# Patient Record
Sex: MALE | HISPANIC OR LATINO | Employment: PART TIME | URBAN - METROPOLITAN AREA
[De-identification: names, ages, dates, MRNs, and addresses within clinical notes are randomized per-mention and may not be internally consistent; named-entity substitution may affect disease eponyms.]

---

## 2017-01-13 ENCOUNTER — SLEEP STUDY (OUTPATIENT)
Dept: SLEEP MEDICINE | Facility: MEDICAL CENTER | Age: 62
End: 2017-01-13
Attending: NURSE PRACTITIONER
Payer: COMMERCIAL

## 2017-01-13 DIAGNOSIS — R06.83 SNORING: ICD-10-CM

## 2017-01-13 DIAGNOSIS — R53.83 FATIGUE, UNSPECIFIED TYPE: ICD-10-CM

## 2017-01-13 PROCEDURE — 95810 POLYSOM 6/> YRS 4/> PARAM: CPT | Performed by: INTERNAL MEDICINE

## 2017-01-16 NOTE — PROCEDURES
Clinical Comments:  The patient underwent a comprehensive polysomnogram using the standard montage for measurement of parameters of sleep, respiratory events, movement abnormalities, heart rate and rhythm. A microphone was used to monitor snoring.      INTERPRETATION:  The total recording time was 403.5 minutes with a sleep period of 329.1 minutes and the total sleep time was 253.5 minutes with a sleep efficiency of 62.8%.  The sleep latency was 74.4 minutes, and REM latency was 199.0 minutes.  The patient experienced 126 arousals in total, for an arousal index of 29.8    RESPIRATORY: The patient had 123 apneas in total.  Of these, 108 were obstructive apneas, and 15 were central apneas.  This resulted in an apnea index (AI) of 29.1.  The patient had 143 hypopneas, for a hypopnea index of 33.8.  The overall AHI was 63.0, while the AHI during Stage R sleep was 67.9.  AHI while supine was 108.0.    OXIMETRY: Oxygen saturation monitoring showed a mean SpO2 of 91.7%, with a minimum oxygen saturation of 0.0%.  Oxygen saturations were less than or = 89% for 26.1 minutes of sleep time.    CARDIAC: The highest heart rate during the recording was 86.0 beats per minute.  The average heart rate during sleep was 62.8 bpm.    LIMB MOVEMENTS: There were a total of 78 PLMs during sleep, of which 7 were PLMs arousals.  This resulted in a PLMS index of 18.5.    Overnight sleep study was accomplished on January 13, 2017 with patient having a delay in sleep onset. Once asleep the patient had frequent events and a total sleep time of 4.2 hours was observed. No slow-wave sleep was seen. Leg movements occurred but arousal index was low. Apnea-hypopnea index was high at 63. Oxygen desaturation as low as 71% and below 90% almost 20% of the time monitored.    This represents severe sleep-disordered breathing, patient should return for CPAP titration given the severity and degree of desaturation. A split-night study could not be accomplished  as there was a delay in sleep onset, consideration should be given to utilizing Ambien if clinically indicated

## 2017-01-31 ENCOUNTER — SLEEP CENTER VISIT (OUTPATIENT)
Dept: SLEEP MEDICINE | Facility: MEDICAL CENTER | Age: 62
End: 2017-01-31
Payer: COMMERCIAL

## 2017-01-31 VITALS
RESPIRATION RATE: 14 BRPM | TEMPERATURE: 98.4 F | DIASTOLIC BLOOD PRESSURE: 82 MMHG | HEIGHT: 69 IN | WEIGHT: 218 LBS | BODY MASS INDEX: 32.29 KG/M2 | SYSTOLIC BLOOD PRESSURE: 130 MMHG | HEART RATE: 79 BPM

## 2017-01-31 DIAGNOSIS — G47.33 OSA (OBSTRUCTIVE SLEEP APNEA): ICD-10-CM

## 2017-01-31 DIAGNOSIS — R53.83 FATIGUE, UNSPECIFIED TYPE: ICD-10-CM

## 2017-01-31 DIAGNOSIS — R06.83 SNORING: ICD-10-CM

## 2017-01-31 DIAGNOSIS — I10 ESSENTIAL HYPERTENSION: ICD-10-CM

## 2017-01-31 DIAGNOSIS — F45.8 BRUXISM: ICD-10-CM

## 2017-01-31 PROCEDURE — 99214 OFFICE O/P EST MOD 30 MIN: CPT | Performed by: NURSE PRACTITIONER

## 2017-01-31 RX ORDER — ZOLPIDEM TARTRATE 5 MG/1
5 TABLET ORAL NIGHTLY PRN
Qty: 3 TAB | Refills: 0 | Status: SHIPPED | OUTPATIENT
Start: 2017-01-31 | End: 2017-03-20

## 2017-01-31 NOTE — PATIENT INSTRUCTIONS
1. Titration study ASAP  2. Please fill Ambien and bring it with you to the night of the sleep study  3. Follow-up with me after titration study to review results

## 2017-01-31 NOTE — PROGRESS NOTES
Chief Complaint   Patient presents with   • Results     SS         HPI: This patient is a 61 y.o. male, who presents for sleep study results. He was just seen in December for evaluation of PROSPER. He was referred by his PCP Dr. Ogden. He has a history of hypertension on lisinopril. He complains of TMJ, snoring and difficulty breathing at night, resuscitative gasps, morning headaches, bruxism. He will wake up 2-3 times per night for unknown reasons. He complains it is very difficult for him to get out of bed in the morning, he will nap after arising. He has seen ENT for complaints of TMJ. He was hoping that evaluation for sleep apnea and treatment may help with TMJ. It is causing him a significant amount of distress. I reiterated at his last visit that treatment for sleep apnea, should he need it, is unlikely to improve his TMJ.   Diagnostic PSG indicates severe PROSPER with an AHI of 63 and a minimum oxygen saturation of 71%. Patient had a difficult time falling asleep the night of study thus, no titration was completed. I reviewed these results with the patient today. We discussed a sleep aid to help for future studies. He requires titration to CPAP given the severity of his sleep apnea. I again reviewed with him the pathophysiology of obstructive sleep apnea as well as potential cardiac implications. He is hoping that treatment for sleep apnea his TMJ and bruxism. I encouraged her to follow-up with his dentist regarding dental appliance for bruxism.      Past Medical History   Diagnosis Date   • Back pain    • Hepatitis B    • TMJ (dislocation of temporomandibular joint)    • Chickenpox    • Influenza    • Tonsillitis        Social History   Substance Use Topics   • Smoking status: Current Every Day Smoker -- 0.25 packs/day for 21 years     Types: Cigarettes   • Smokeless tobacco: Never Used   • Alcohol Use: 0.0 - 3.0 oz/week     0-5 Cans of beer per week      Comment: Occasionally       Family History   Problem  "Relation Age of Onset   • No Known Problems Mother    • Diabetes Father    • No Known Problems Sister    • No Known Problems Brother    • Sleep Apnea Neg Hx    • No Known Problems Sister    • No Known Problems Sister    • No Known Problems Brother    • No Known Problems Brother    • No Known Problems Brother    • No Known Problems Brother    • No Known Problems Daughter    • No Known Problems Son    • No Known Problems Daughter        Current medications as of today   Current Outpatient Prescriptions   Medication Sig Dispense Refill   • zolpidem (AMBIEN) 5 MG Tab Take 1 Tab by mouth at bedtime as needed for Sleep (1 to 3 po qhs prn insomnia/sleep study. Bring to sleep study.). 3 Tab 0   • ULORIC 40 MG Tab Take 40 mg by mouth every day.  5   • lisinopril (PRINIVIL) 10 MG Tab Take 10 mg by mouth.  6   • CIALIS 20 MG tablet Take 20 mg by mouth 1 time daily as needed.  6     No current facility-administered medications for this visit.       Allergies: Ibuprofen    Blood pressure 130/82, pulse 79, temperature 36.9 °C (98.4 °F), temperature source Temporal, resp. rate 14, height 1.753 m (5' 9.02\"), weight 98.884 kg (218 lb).      ROS:   Constitutional: Denies fevers, chills, night sweats, weight loss. Positive for fatigue.  HEENT: Denies earache, difficulty hearing, tinnitus, nasal congestion, hoarseness  Cardiovascular: Denies chest pain, tightness, palpitations, orthopnea or edema  Respiratory: Denies, wheeze, dyspnea, hemoptysis. Currently has a cold, mild dry cough  Sleep: See HPI  GI: Denies heartburn, dysphagia, nausea, abdominal pain, diarrhea or constipation  : Denies frequent urination, hematuria, discharge or painful urination  Musculoskeletal: Denies back pain, painful joints, sore muscles  Neurological: Denies weakness or headaches  Skin: No rashes    Physical exam:   Appearance: Well-nourished, well-developed, in no acute distress  HEENT: Normocephalic, atraumatic, white sclera, PERRLA  Respiratory: no " intercostal retractions or accessory muscle use   Lungs auscultation: Clear to auscultation bilaterally  Cardiovascular: Regular rate rhythm no murmurs, rubs or gallops  Gait: Normal  Digits: No clubbing, cyanosis  Motor: No focal deficits  Orientation: Oriented to time, person and place    Diagnosis:  1. Snoring     2. PROSPER (obstructive sleep apnea)  zolpidem (AMBIEN) 5 MG Tab    POLYSOMNOGRAPHY TITRATION   3. Bruxism     4. Fatigue, unspecified type     5. Essential hypertension         Plan:  1. Titration study ASAP  2. Rx for Ambien provided  3. Follow-up after titration to review results  4. Follow up with dentist regarding appliance for bruxism

## 2017-01-31 NOTE — MR AVS SNAPSHOT
"        Terrell Ruthos   2017 9:40 AM   Sleep Center Visit   MRN: [id number]    Department:  [facility]   Dept Phone:  [phone]    Description:  Male : 1955   Provider:  LO Church           Reason for Visit     Results SS      Allergies as of 2017     Allergen Noted Reactions    Ibuprofen 2016   Hives, Itching      You were diagnosed with     Snoring   [130933]       PROSPER (obstructive sleep apnea)   [342150]       Bruxism   [671420]       Fatigue, unspecified type   [7884920]       Essential hypertension   [2956528]         Vital Signs     Blood Pressure Pulse Temperature Respirations Height Weight    130/82 mmHg 79 36.9 °C (98.4 °F) (Temporal) 14 1.753 m (5' 9.02\") 98.884 kg (218 lb)    Body Mass Index Smoking Status                32.18 kg/m2 Current Every Day Smoker          Basic Information     Date Of Birth Sex Race Ethnicity Preferred Language    1955 Male  or   Origin (Tongan,Ethiopian,Nicaraguan,Ivorian, etc) English      Your appointments     Mar 07, 2017  8:00 PM   Sleep Study with SLEEP TECH   [facility] (--)    [address]  [facility] [address]  [place] [id number] [phone]            Mar 17, 2017  9:40 AM   Follow UP with LO Church   [facility] (--)    [address]  [facility] [address]  [place] [address]   [phone]              Problem List              ICD-10-CM Priority Class Noted - Resolved    Snoring R06.83   2016 - Present    Fatigue R53.83   2016 - Present    TMJ (dislocation of temporomandibular joint) S03.00XA   2016 - Present    HTN (hypertension) I10   2016 - Present    Bruxism F45.8   2017 - Present      Health Maintenance        Date Due Completion Dates    IMM DTaP/Tdap/Td Vaccine (1 - Tdap) 10/1/1975 ---    COLONOSCOPY 10/1/2006 ---    IMM INFLUENZA (1) 2016 ---    IMM ZOSTER VACCINE 10/1/2016 ---            "   Current Immunizations     No immunizations on file.      Below and/or attached are the medications your provider expects you to take. Review all of your home medications and newly ordered medications with your provider and/or pharmacist. Follow medication instructions as directed by your provider and/or pharmacist. Please keep your medication list with you and share with your provider. Update the information when medications are discontinued, doses are changed, or new medications (including over-the-counter products) are added; and carry medication information at all times in the event of emergency situations     Allergies:  IBUPROFEN - Hives,Itching               Medications  Valid as of: January 31, 2017 - 10:09 AM    Generic Name Brand Name Tablet Size Instructions for use    Febuxostat (Tab) ULORIC 40 MG Take 40 mg by mouth every day.        Lisinopril (Tab) PRINIVIL 10 MG Take 10 mg by mouth.        Tadalafil (Tab) CIALIS 20 MG Take 20 mg by mouth 1 time daily as needed.        Zolpidem Tartrate (Tab) AMBIEN 5 MG Take 1 Tab by mouth at bedtime as needed for Sleep (1 to 3 po qhs prn insomnia/sleep study. Bring to sleep study.).        .                 Medicines prescribed today were sent to:     [facility]/PHARMACY #[id number] - [place], [place] - [address]    [address] [place] [address]    Phone: [phone] Fax: [phone]    Open 24 Hours?: No      Medication refill instructions:       If your prescription bottle indicates you have medication refills left, it is not necessary to call your provider’s office. Please contact your pharmacy and they will refill your medication.    If your prescription bottle indicates you do not have any refills left, you may request refills at any time through one of the following ways: The online eBureau system (except Urgent Care), by calling your provider’s office, or by asking your pharmacy to contact your provider’s office with a refill request. Medication refills are  processed only during regular business hours and may not be available until the next business day. Your provider may request additional information or to have a follow-up visit with you prior to refilling your medication.   *Please Note: Medication refills are assigned a new Rx number when refilled electronically. Your pharmacy may indicate that no refills were authorized even though a new prescription for the same medication is available at the pharmacy. Please request the medicine by name with the pharmacy before contacting your provider for a refill.        Your To Do List     Future Labs/Procedures Complete By Expires    POLYSOMNOGRAPHY TITRATION  As directed 1/31/2018      Instructions    1. Titration study ASAP  2. Please fill Ambien and bring it with you to the night of the sleep study  3. Follow-up with me after titration study to review results          Youjia Access Code: [email or web address]  Expires: 2/13/2017  9:39 AM    Youjia  A secure, online tool to manage your health information     [employer]’s Youjia® is a secure, online tool that connects you to your personalized health information from the privacy of your home -- day or night - making it very easy for you to manage your healthcare. Once the activation process is completed, you can even access your medical information using the Youjia isidro, which is available for free in the Apple Isidro store or Google Play store.     Youjia provides the following levels of access (as shown below):   My Chart Features   [facility] Doctor [facility]  Specialists [facility]  Urgent  Care Non-[facility]  Primary Care  Doctor   Email your healthcare team securely and privately 24/7 X X X    Manage appointments: schedule your next appointment; view details of past/upcoming appointments X      Request prescription refills. X      View recent personal medical records, including lab and immunizations X X X X   View health record, including health history, allergies,  medications X X X X   Read reports about your outpatient visits, procedures, consult and ER notes X X X X   See your discharge summary, which is a recap of your hospital and/or ER visit that includes your diagnosis, lab results, and care plan. X X       How to register for Wickr:  1. Go to  [email or web address].  2. Click on the Sign Up Now box, which takes you to the New Member Sign Up page. You will need to provide the following information:  a. Enter your Wickr Access Code exactly as it appears at the top of this page. (You will not need to use this code after you’ve completed the sign-up process. If you do not sign up before the expiration date, you must request a new code.)   b. Enter your date of birth.   c. Enter your home email address.   d. Click Submit, and follow the next screen’s instructions.  3. Create a Wickr ID. This will be your Wickr login ID and cannot be changed, so think of one that is secure and easy to remember.  4. Create a Wickr password. You can change your password at any time.  5. Enter your Password Reset Question and Answer. This can be used at a later time if you forget your password.   6. Enter your e-mail address. This allows you to receive e-mail notifications when new information is available in Wickr.  7. Click Sign Up. You can now view your health information.    For assistance activating your Wickr account, call [phone]

## 2017-03-07 ENCOUNTER — SLEEP STUDY (OUTPATIENT)
Dept: SLEEP MEDICINE | Facility: MEDICAL CENTER | Age: 62
End: 2017-03-07
Attending: NURSE PRACTITIONER
Payer: COMMERCIAL

## 2017-03-07 DIAGNOSIS — G47.33 OSA (OBSTRUCTIVE SLEEP APNEA): ICD-10-CM

## 2017-03-07 PROCEDURE — 95810 POLYSOM 6/> YRS 4/> PARAM: CPT | Performed by: INTERNAL MEDICINE

## 2017-03-07 NOTE — MR AVS SNAPSHOT
Terrell White   3/7/2017 8:00 PM   Sleep Study   MRN: [id number]    Department:  [facility]   Dept Phone:  [phone]    Description:  Male : 1955   Provider:  Ramiro Dennison M.D.           Allergies as of 3/7/2017     Allergen Noted Reactions    Ibuprofen 2016   Hives, Itching      You were diagnosed with     PROSPER (obstructive sleep apnea)   [811045]         Vital Signs     Smoking Status                   Current Every Day Smoker           Basic Information     Date Of Birth Sex Race Ethnicity Preferred Language    1955 Male  or   Origin (Welsh,Fijian,[place] citizen,Botswanan, etc) English      Your appointments     Mar 17, 2017  9:40 AM   Follow UP with LO Church   [facility] (--)    [address] [facility]  [place] [address]   [phone]              Problem List              ICD-10-CM Priority Class Noted - Resolved    Snoring R06.83   2016 - Present    Fatigue R53.83   2016 - Present    TMJ (dislocation of temporomandibular joint) S03.00XA   2016 - Present    HTN (hypertension) I10   2016 - Present    Bruxism F45.8   2017 - Present      Health Maintenance        Date Due Completion Dates    IMM DTaP/Tdap/Td Vaccine (1 - Tdap) 10/1/1975 ---    COLONOSCOPY 10/1/2006 ---    IMM INFLUENZA (1) 2016 ---    IMM ZOSTER VACCINE 10/1/2016 ---            Current Immunizations     No immunizations on file.      Below and/or attached are the medications your provider expects you to take. Review all of your home medications and newly ordered medications with your provider and/or pharmacist. Follow medication instructions as directed by your provider and/or pharmacist. Please keep your medication list with you and share with your provider. Update the information when medications are discontinued, doses are changed, or new medications (including over-the-counter products) are added; and  carry medication information at all times in the event of emergency situations     Allergies:  IBUPROFEN - Hives,Itching               Medications  Valid as of: March 08, 2017 -  6:28 AM    Generic Name Brand Name Tablet Size Instructions for use    Febuxostat (Tab) ULORIC 40 MG Take 40 mg by mouth every day.        Lisinopril (Tab) PRINIVIL 10 MG Take 10 mg by mouth.        Tadalafil (Tab) CIALIS 20 MG Take 20 mg by mouth 1 time daily as needed.        Zolpidem Tartrate (Tab) AMBIEN 5 MG Take 1 Tab by mouth at bedtime as needed for Sleep (1 to 3 po qhs prn insomnia/sleep study. Bring to sleep study.).        .                 Medicines prescribed today were sent to:     [facility]/PHARMACY #[id number] - [place], [place] - [address]    [address] [place] [address]    Phone: [phone] Fax: [phone]    Open 24 Hours?: No      Medication refill instructions:       If your prescription bottle indicates you have medication refills left, it is not necessary to call your provider’s office. Please contact your pharmacy and they will refill your medication.    If your prescription bottle indicates you do not have any refills left, you may request refills at any time through one of the following ways: The online Couplewise system (except Urgent Care), by calling your provider’s office, or by asking your pharmacy to contact your provider’s office with a refill request. Medication refills are processed only during regular business hours and may not be available until the next business day. Your provider may request additional information or to have a follow-up visit with you prior to refilling your medication.   *Please Note: Medication refills are assigned a new Rx number when refilled electronically. Your pharmacy may indicate that no refills were authorized even though a new prescription for the same medication is available at the pharmacy. Please request the medicine by name with the pharmacy before contacting your  provider for a refill.           Jobspotting Access Code: [email or web address]  Expires: 3/14/2017 10:22 AM    Jobspotting  A secure, online tool to manage your health information     [employer]’s Jobspotting® is a secure, online tool that connects you to your personalized health information from the privacy of your home -- day or night - making it very easy for you to manage your healthcare. Once the activation process is completed, you can even access your medical information using the Jobspotting isidro, which is available for free in the Apple Isidro store or Google Play store.     Jobspotting provides the following levels of access (as shown below):   My Chart Features   [facility] Primary Care Doctor [facility]  Specialists [facility]  Urgent  Care Non-[facility]  Primary Care  Doctor   Email your healthcare team securely and privately 24/7 X X X    Manage appointments: schedule your next appointment; view details of past/upcoming appointments X      Request prescription refills. X      View recent personal medical records, including lab and immunizations X X X X   View health record, including health history, allergies, medications X X X X   Read reports about your outpatient visits, procedures, consult and ER notes X X X X   See your discharge summary, which is a recap of your hospital and/or ER visit that includes your diagnosis, lab results, and care plan. X X       How to register for Jobspotting:  1. Go to  [email or web address].  2. Click on the Sign Up Now box, which takes you to the New Member Sign Up page. You will need to provide the following information:  a. Enter your Jobspotting Access Code exactly as it appears at the top of this page. (You will not need to use this code after you’ve completed the sign-up process. If you do not sign up before the expiration date, you must request a new code.)   b. Enter your date of birth.   c. Enter your home email address.   d. Click Submit, and follow the next screen’s instructions.  3. Create a  3-V Bioscien[employer] ID. This will be your [employer] login ID and cannot be changed, so think of one that is secure and easy to remember.  4. Create a [employer] password. You can change your password at any time.  5. Enter your Password Reset Question and Answer. This can be used at a later time if you forget your password.   6. Enter your e-mail address. This allows you to receive e-mail notifications when new information is available in [employer].  7. Click Sign Up. You can now view your health information.    For assistance activating your [employer] account, call [phone]        Quit Tobacco Information     Do you want to quit using tobacco?    Quitting tobacco decreases risks of cancer, heart and lung disease, increases life expectancy, improves sense of taste and smell, and increases spending money, among other benefits.    If you are thinking about quitting, we can help.  • [facility] Quit Tobacco Program: 661.469.3962  o Program occurs weekly for four weeks and includes pharmacist consultation on products to support quitting smoking or chewing tobacco. A provider referral is needed for pharmacist consultation.  • Tobacco Users Help Hotline: 5-191-QUIT-NOW (822-3340) or https://[place].quitlogix.org/  o Free, confidential telephone and online coaching for [place] residents. Sessions are designed on a schedule that is convenient for you. Eligible clients receive free nicotine replacement therapy.  • Nationally: www.smokefree.gov  o Information and professional assistance to support both immediate and long-term needs as you become, and remain, a non-smoker. Smokefree.gov allows you to choose the help that best fits your needs.

## 2017-03-08 NOTE — PROCEDURES
Clinical Comments:  The patient underwent a comprehensive polysomnogram using the standard montage for measurement of parameters of sleep, respiratory events, movement abnormalities, heart rate and rhythm. A microphone was used to monitor snoring.      INTERPRETATION:  The total recording time was 416.0 minutes with a sleep period of 399.5 minutes and the total sleep time was 351.0 minutes with a sleep efficiency of 84.4%.  The sleep latency was 16.5 minutes, and REM latency was 36.5 minutes.  The patient experienced 187 arousals in total, for an arousal index of 32.0    RESPIRATORY: The patient had 1 apneas in total.  Of these, 0 were obstructive apneas, and 1 were central apneas.  This resulted in an apnea index (AI) of 0.2.  The patient had 70 hypopneas, for a hypopnea index of 12.0.  The overall AHI was 12.1, while the AHI during Stage R sleep was 9.3.  AHI while supine was 16.5.    OXIMETRY: Oxygen saturation monitoring showed a mean SpO2 of 95.0%, with a minimum oxygen saturation of 0.0%.  Oxygen saturations were less than or = 89% for 1.0 minutes of sleep time.    CARDIAC: The highest heart rate during the recording was 92.0 beats per minute.  The average heart rate during sleep was 68.3 bpm.    LIMB MOVEMENTS: There were a total of 76 PLMs during sleep, of which 4 were PLMs arousals.  This resulted in a PLMS index of 13.0.    The patient is a 61-year-old male who has hypertension and snoring with daytime fatigue. A previous sleep study demonstrated an apnea hypopnea index of 63 per hour and a low oxygen saturation of 71%.    Technical summary: The patient underwent a CPAP titration.  This was a 16 channel montage study to include a 6 channel EEG, a 2 channel EOG, and chin EMG, left and right leg EMG, a snore channel, and a CFLOW pressure transducer.   Respiratory effort was assessed with the use of a thoracic and abdominal monitor and overnight oximetry was obtained. Audio and video recordings were  reviewed. This was a fully attended study and sleep stage scoring was performed. The test was technically adequate.    General sleep summary:  During the overnight study, there was a normal sleep latency and moderately early at 36 minutes REM latency.   The total sleep time was 351 minutes and sleep efficiency was 84%.  Sleep stage proportions showed 9.5% stage I, 60% stage II and 22% REM sleep. There was no delta sleep.  In regards to sleep quality there was a moderate to severe degree of sleep fragmentation as shown by the arousal index of 32 an hour. The arousals were due to obstructive hypopneas predominantly.    CPAP Titration:  The CPAP pressure was initiated at 4 cm of water and the pressure was increased in an attempt to eliminate all sleep disordered breathing and snoring. The CPAP pressure was increased to 12 cm water and at this final pressure the patient was observed in the supine position and in the REM sleep stage. The apnea hypopnea index was 4.3 per hour and the low oxygen saturation 90% however some mild upper airway resistance was still observed at the final setting and a slightly higher CPAP pressure is recommended. Snoring was resolved. There were no significant periodic limb movements.  The patient demonstrated normal sinus rhythm and an average heart rate of 67 beats per minute.  There was no ventricular ectopy or tachyarrhythmias. The patient utilized large Priscilla View full face mask with heated humidification. The CPAP was well-tolerated and there were minimal air leaks. No supplemental oxygen was required.    Impression:  1. Nearly successful CPAP titration to 12 cm water with evidence of mild persistent upper airway resistance still observed at the final setting. A slightly higher CPAP pressures recommended  2. Obstructive sleep apnea  3. Hypertension  4. Excessive daytime somnolence  5. Early REM latency most likely due to initiation of CPAP therapy with prior REM sleep deprivation from  obstructive sleep apnea    Recommendations:  Recommend CPAP at 13 cm of water. Recommended a data card that can measure leak, apnea hypopnea index and compliance for further outpatient monitoring and management of CPAP therapy. In some cases alternative treatment options may prove effective in resolving sleep apnea and these options include upper airway surgery, the use of a dental orthotic or weight loss and positional therapy. Clinical correlation is required. In general patients with sleep apnea are advised to avoid alcohol and sedatives and to not operate a motor vehicle while drowsy. Also untreated sleep apnea is associated with an increased risk for cardiovascular disease.

## 2017-03-17 ENCOUNTER — APPOINTMENT (OUTPATIENT)
Dept: SLEEP MEDICINE | Facility: MEDICAL CENTER | Age: 62
End: 2017-03-17
Payer: COMMERCIAL

## 2017-03-20 ENCOUNTER — SLEEP CENTER VISIT (OUTPATIENT)
Dept: SLEEP MEDICINE | Facility: MEDICAL CENTER | Age: 62
End: 2017-03-20
Payer: COMMERCIAL

## 2017-03-20 VITALS
RESPIRATION RATE: 16 BRPM | TEMPERATURE: 97.9 F | DIASTOLIC BLOOD PRESSURE: 84 MMHG | HEIGHT: 69 IN | BODY MASS INDEX: 32.58 KG/M2 | WEIGHT: 220 LBS | HEART RATE: 69 BPM | OXYGEN SATURATION: 96 % | SYSTOLIC BLOOD PRESSURE: 126 MMHG

## 2017-03-20 DIAGNOSIS — G47.33 OSA (OBSTRUCTIVE SLEEP APNEA): ICD-10-CM

## 2017-03-20 PROCEDURE — 99214 OFFICE O/P EST MOD 30 MIN: CPT | Performed by: NURSE PRACTITIONER

## 2017-03-20 RX ORDER — ZOLPIDEM TARTRATE 5 MG/1
5 TABLET ORAL NIGHTLY PRN
Qty: 30 TAB | Refills: 0 | Status: ON HOLD | OUTPATIENT
Start: 2017-03-20 | End: 2019-12-19

## 2017-03-20 NOTE — PROGRESS NOTES
CC:  Here for f/u sleep issues as listed below    HPI:   Terrell presents today for follow up obstructive sleep apnea and titration results.  PSG from 1/2017 indicated an AHI of 63 and low oxygen of 71%.  He completed a titration study 3/2017 that was nearly successful at a CPAP pressure of 12 with a reduced AHI, correction of low oxygen, and REM rebound. However, for residual persistent upper airway resistance, a pressure of 13 is recommended. Patient is amendable to CPAP treatment. They understand they may need a future sleep study if treatment is ineffective. He has a hx of TMJ and requesting a referral to a specialist.  He already has been seen by an ENT, but reports he has not been provided treatment and is frustrated. I encouraged a second opinion. He was also encouraged per last OV to follow-up with his dentist regarding dental appliance for bruxism.    Patient is currently sleeping 6-8 hours per night with 1-3 nighttime awakenings. They have had trouble falling asleep and have taken Advil PM 1-3 pills/night for many years. HE did well the night of the study with ambien and requesting ambien. He finds he moves a lot at night. He had a PMLS index of 13.  They do not feel refreshed in the morning and c/o morning H/A, but feels it may be related to his TMJ. They occasionally feel tired throughout the day and takes naps  2-3 per week for appx 1-1 1/2 hours long.  Patient reports snoring and paroxysmal nocturnal dyspnea events. Denies apnea events. They have never fallen asleep in conversation, at the wheel, or at work.  They deny sleepwalking/talking.     Patient Active Problem List    Diagnosis Date Noted   • PROSPER (obstructive sleep apnea) 03/20/2017   • Bruxism 01/31/2017   • Snoring 12/02/2016   • Fatigue 12/02/2016   • TMJ (dislocation of temporomandibular joint) 12/02/2016   • HTN (hypertension) 12/02/2016       Past Medical History   Diagnosis Date   • Back pain    • Hepatitis B    • TMJ (dislocation of  temporomandibular joint)    • Chickenpox    • Influenza    • Tonsillitis        Past Surgical History   Procedure Laterality Date   • Carpal tunnel release     • Arthroscopy, knee     • Shoulder arthroplasty total     • Ear reconstruction         Family History   Problem Relation Age of Onset   • No Known Problems Mother    • Diabetes Father    • No Known Problems Sister    • No Known Problems Brother    • Sleep Apnea Neg Hx    • No Known Problems Sister    • No Known Problems Sister    • No Known Problems Brother    • No Known Problems Brother    • No Known Problems Brother    • No Known Problems Brother    • No Known Problems Daughter    • No Known Problems Son    • No Known Problems Daughter        Social History     Social History   • Marital Status:      Spouse Name: N/A   • Number of Children: N/A   • Years of Education: N/A     Occupational History   • Not on file.     Social History Main Topics   • Smoking status: Current Every Day Smoker -- 0.25 packs/day for 21 years     Types: Cigarettes   • Smokeless tobacco: Never Used   • Alcohol Use: 0.0 - 3.0 oz/week     0-5 Cans of beer per week      Comment: Occasionally   • Drug Use: No   • Sexual Activity: Not on file     Other Topics Concern   • Not on file     Social History Narrative       Current Outpatient Prescriptions   Medication Sig Dispense Refill   • zolpidem (AMBIEN) 5 MG Tab Take 1 Tab by mouth at bedtime as needed for Sleep. 30 Tab 0   • ULORIC 40 MG Tab Take 40 mg by mouth every day.  5   • lisinopril (PRINIVIL) 10 MG Tab Take 10 mg by mouth.  6   • CIALIS 20 MG tablet Take 20 mg by mouth 1 time daily as needed.  6     No current facility-administered medications for this visit.          Allergies: Ibuprofen      ROS   Gen: Denies fever, chills, unintentional weight loss, fatigue  Resp:Denies Dyspnea  CV: Denies chest pain, chest tightness  Sleep:Denies apnea  Neuro: Denies frequent headaches, weakness, dizziness  See HPI.  All other systems  "reviewed and negative        Vital signs for this encounter:  Filed Vitals:    03/20/17 1540   Height: 1.753 m (5' 9.02\")   Weight: 99.791 kg (220 lb)   Weight % change since last entry.: 0 %   BP: 126/84   Pulse: 69   BMI (Calculated): 32.47   Resp: 16   Temp: 36.6 °C (97.9 °F)   O2 sat % room air: 96 %                   Physical Exam:   Gen:         Alert and oriented, No apparent distress.   Neck:        No Lymphadenopathy.  Lungs:     Clear to auscultation bilaterally.    CV:          Regular rate and rhythm. No murmurs, rubs or gallops.   Abd:         Soft non tender, non distended.            Ext:          No clubbing, cyanosis, edema.    Assessment   1. PROSPER (obstructive sleep apnea)  DME CPAP    zolpidem (AMBIEN) 5 MG Tab       PLAN:   Patient Instructions   1) Start CPAP at [id number]  2) Discussed acclimating to machine and change mask within first 30 days if required. Bring machine to first appointment.  3) Return to ENT for further questions regarding TMJ  4) Vaccines: Recommend flu  5) Return in about 6 weeks (around 5/1/2017) for review of symptoms, if not sooner, follow up with YUSRA Bravo, Compliance.          "

## 2017-03-20 NOTE — MR AVS SNAPSHOT
"        Terrell Ruthos   3/20/2017 3:40 PM   Sleep Center Visit   MRN: [id number]    Department:  [facility]   Dept Phone:  [phone]    Description:  Male : 1955   Provider:  LO Swanson           Reason for Visit     Apnea Last seen 17    Results SS       Allergies as of 3/20/2017     Allergen Noted Reactions    Ibuprofen 2016   Hives, Itching      You were diagnosed with     PROSPER (obstructive sleep apnea)   [[id number]]         Vital Signs     Blood Pressure Pulse Temperature Respirations Height Weight    126/84 mmHg 69 36.6 °C (97.9 °F) 16 1.753 m (5' 9.02\") 99.791 kg (220 lb)    Body Mass Index Oxygen Saturation Smoking Status             32.47 kg/m2 96% Current Every Day Smoker         Basic Information     Date Of Birth Sex Race Ethnicity Preferred Language    1955 Male  or   Origin (Bermudian,Dutch,Swazi,Fijian, etc) English      Your appointments     May 05, 2017  8:40 AM   Follow UP with LO Swanson   [facility] (--)    [address] [id number] [phone]              Problem List              ICD-10-CM Priority Class Noted - Resolved    Snoring R06.83   2016 - Present    Fatigue R53.83   2016 - Present    TMJ (dislocation of temporomandibular joint) S03.00XA   2016 - Present    HTN (hypertension) I10   2016 - Present    Bruxism F45.8   2017 - Present    PROSPER (obstructive sleep apnea) G47.33   3/20/2017 - Present      Health Maintenance        Date Due Completion Dates    IMM DTaP/Tdap/Td Vaccine (1 - Tdap) 10/1/1975 ---    COLONOSCOPY 10/1/2006 ---    IMM INFLUENZA (1) 2016 ---    IMM ZOSTER VACCINE 10/1/2016 ---            Current Immunizations     No immunizations on file.      Below and/or attached are the medications your provider expects you to take. Review all of your home medications and newly ordered medications with your " provider and/or pharmacist. Follow medication instructions as directed by your provider and/or pharmacist. Please keep your medication list with you and share with your provider. Update the information when medications are discontinued, doses are changed, or new medications (including over-the-counter products) are added; and carry medication information at all times in the event of emergency situations     Allergies:  IBUPROFEN - Hives,Itching               Medications  Valid as of: March 20, 2017 -  4:12 PM    Generic Name Brand Name Tablet Size Instructions for use    Febuxostat (Tab) ULORIC 40 MG Take 40 mg by mouth every day.        Lisinopril (Tab) PRINIVIL 10 MG Take 10 mg by mouth.        Tadalafil (Tab) CIALIS 20 MG Take 20 mg by mouth 1 time daily as needed.        Zolpidem Tartrate (Tab) AMBIEN 5 MG Take 1 Tab by mouth at bedtime as needed for Sleep.        .                 Medicines prescribed today were sent to:     [id number] - [place], [place] - [address]    [address] [place] [address]    Phone: [phone] Fax: [phone]    Open 24 Hours?: No      Medication refill instructions:       If your prescription bottle indicates you have medication refills left, it is not necessary to call your provider’s office. Please contact your pharmacy and they will refill your medication.    If your prescription bottle indicates you do not have any refills left, you may request refills at any time through one of the following ways: The online DBVu system (except Urgent Care), by calling your provider’s office, or by asking your pharmacy to contact your provider’s office with a refill request. Medication refills are processed only during regular business hours and may not be available until the next business day. Your provider may request additional information or to have a follow-up visit with you prior to refilling your medication.   *Please Note: Medication refills are assigned a new Rx  number when refilled electronically. Your pharmacy may indicate that no refills were authorized even though a new prescription for the same medication is available at the pharmacy. Please request the medicine by name with the pharmacy before contacting your provider for a refill.        Instructions    1) Start CPAP at [id number]  2) Discussed acclimating to machine and change mask within first 30 days if required. Bring machine to first appointment.  3) Return to ENT for further questions regarding TMJ  4) Vaccines: Recommend flu  5) Return in about 6 weeks (around 5/1/2017) for review of symptoms, if not sooner, follow up with YUSRA Bravo, Compliance.              Adept Cloud Access Code: [email or web address]  Expires: 4/19/2017  4:12 PM    Adept Cloud  A secure, online tool to manage your health information     [employer]’s Adept Cloud® is a secure, online tool that connects you to your personalized health information from the privacy of your home -- day or night - making it very easy for you to manage your healthcare. Once the activation process is completed, you can even access your medical information using the Adept Cloud isidro, which is available for free in the Apple Isidro store or Google Play store.     Adept Cloud provides the following levels of access (as shown below):   My Chart Features   [facility] Doctor [facility]  Specialists [facility]  Urgent  Care Non-[facility]  Primary Care  Doctor   Email your healthcare team securely and privately 24/7 X X X    Manage appointments: schedule your next appointment; view details of past/upcoming appointments X      Request prescription refills. X      View recent personal medical records, including lab and immunizations X X X X   View health record, including health history, allergies, medications X X X X   Read reports about your outpatient visits, procedures, consult and ER notes X X X X   See your discharge summary, which is a recap of your hospital and/or ER visit that  includes your diagnosis, lab results, and care plan. X X       How to register for [employer]:  1. Go to  [email or web address].  2. Click on the Sign Up Now box, which takes you to the New Member Sign Up page. You will need to provide the following information:  a. Enter your Webshoz Access Code exactly as it appears at the top of this page. (You will not need to use this code after you’ve completed the sign-up process. If you do not sign up before the expiration date, you must request a new code.)   b. Enter your date of birth.   c. Enter your home email address.   d. Click Submit, and follow the next screen’s instructions.  3. Create a Webshoz ID. This will be your Webshoz login ID and cannot be changed, so think of one that is secure and easy to remember.  4. Create a [employer] password. You can change your password at any time.  5. Enter your Password Reset Question and Answer. This can be used at a later time if you forget your password.   6. Enter your e-mail address. This allows you to receive e-mail notifications when new information is available in Webshoz.  7. Click Sign Up. You can now view your health information.    For assistance activating your Webshoz account, call [phone]        Quit Tobacco Information     Do you want to quit using tobacco?    Quitting tobacco decreases risks of cancer, heart and lung disease, increases life expectancy, improves sense of taste and smell, and increases spending money, among other benefits.    If you are thinking about quitting, we can help.  • [facility] Quit Tobacco Program: [phone]  o Program occurs weekly for four weeks and includes pharmacist consultation on products to support quitting smoking or chewing tobacco. A provider referral is needed for pharmacist consultation.  • Tobacco Users Help Hotline: 3-800-QUIT-NOW (823-7506) or https://[place].quitlogix.org/  o Free, confidential telephone and online coaching for [place] residents. Sessions are  designed on a schedule that is convenient for you. Eligible clients receive free nicotine replacement therapy.  • Nationally: www.smokefree.gov  o Information and professional assistance to support both immediate and long-term needs as you become, and remain, a non-smoker. Smokefree.gov allows you to choose the help that best fits your needs.

## 2017-03-20 NOTE — PATIENT INSTRUCTIONS
1) Start CPAP at [id number]  2) Discussed acclimating to machine and change mask within first 30 days if required. Bring machine to first appointment.  3) Return to ENT for further questions regarding TMJ  4) Vaccines: Recommend flu  5) Return in about 6 weeks (around 5/1/2017) for review of symptoms, if not sooner, follow up with YUSRA Bravo, Compliance.

## 2017-04-17 ENCOUNTER — HOSPITAL ENCOUNTER (OUTPATIENT)
Dept: RADIOLOGY | Facility: MEDICAL CENTER | Age: 62
End: 2017-04-17
Attending: FAMILY MEDICINE
Payer: COMMERCIAL

## 2017-04-17 DIAGNOSIS — M54.2 CERVICALGIA: ICD-10-CM

## 2017-04-17 PROCEDURE — 72141 MRI NECK SPINE W/O DYE: CPT

## 2017-05-05 ENCOUNTER — SLEEP CENTER VISIT (OUTPATIENT)
Dept: SLEEP MEDICINE | Facility: MEDICAL CENTER | Age: 62
End: 2017-05-05
Payer: COMMERCIAL

## 2017-05-05 VITALS
SYSTOLIC BLOOD PRESSURE: 132 MMHG | DIASTOLIC BLOOD PRESSURE: 84 MMHG | WEIGHT: 221 LBS | HEART RATE: 63 BPM | OXYGEN SATURATION: 93 % | RESPIRATION RATE: 16 BRPM | TEMPERATURE: 98.1 F | HEIGHT: 69 IN | BODY MASS INDEX: 32.73 KG/M2

## 2017-05-05 DIAGNOSIS — G47.33 OSA (OBSTRUCTIVE SLEEP APNEA): ICD-10-CM

## 2017-05-05 PROCEDURE — 99213 OFFICE O/P EST LOW 20 MIN: CPT | Performed by: NURSE PRACTITIONER

## 2017-05-05 NOTE — MR AVS SNAPSHOT
"Collinsgino GALVEZ Christopher   2017 8:40 AM   Sleep Center Visit   MRN: [id number]    Department:  [facility]   Dept Phone:  [phone]    Description:  Male : 1955   Provider:  LO Swanson           Reason for Visit     Apnea Last seen 3/20/17      Allergies as of 2017     Allergen Noted Reactions    Ibuprofen 2016   Hives, Itching      You were diagnosed with     PROSPER (obstructive sleep apnea)   [[id number]]         Vital Signs     Blood Pressure Pulse Temperature Respirations Height Weight    132/84 mmHg 63 36.7 °C (98.1 °F) 16 1.753 m (5' 9.02\") 100.245 kg (221 lb)    Body Mass Index Oxygen Saturation Smoking Status             32.62 kg/m2 93% Current Every Day Smoker         Basic Information     Date Of Birth Sex Race Ethnicity Preferred Language    1955 Male  or   Origin (Swazi,Northern Irish,Sao Tomean,Graham, etc) English      Your appointments     Aug 16, 2017  8:40 AM   Follow UP with LO Swanson   [facility] (--)    [address] [id number] [phone]              Problem List              ICD-10-CM Priority Class Noted - Resolved    Snoring R06.83   2016 - Present    Fatigue R53.83   2016 - Present    TMJ (dislocation of temporomandibular joint) S03.00XA   2016 - Present    HTN (hypertension) I10   2016 - Present    Bruxism F45.8   2017 - Present    PROSPER (obstructive sleep apnea) G47.33   3/20/2017 - Present      Health Maintenance        Date Due Completion Dates    IMM DTaP/Tdap/Td Vaccine (1 - Tdap) 10/1/1975 ---    COLONOSCOPY 10/1/2006 ---    IMM ZOSTER VACCINE 10/1/2016 ---            Current Immunizations     No immunizations on file.      Below and/or attached are the medications your provider expects you to take. Review all of your home medications and newly ordered medications with your provider and/or pharmacist. Follow medication " instructions as directed by your provider and/or pharmacist. Please keep your medication list with you and share with your provider. Update the information when medications are discontinued, doses are changed, or new medications (including over-the-counter products) are added; and carry medication information at all times in the event of emergency situations     Allergies:  IBUPROFEN - Hives,Itching               Medications  Valid as of: May 05, 2017 -  9:07 AM    Generic Name Brand Name Tablet Size Instructions for use    Febuxostat (Tab) ULORIC 40 MG Take 40 mg by mouth every day.        Lisinopril (Tab) PRINIVIL 10 MG Take 10 mg by mouth.        Tadalafil (Tab) CIALIS 20 MG Take 20 mg by mouth 1 time daily as needed.        Zolpidem Tartrate (Tab) AMBIEN 5 MG Take 1 Tab by mouth at bedtime as needed for Sleep.        .                 Medicines prescribed today were sent to:     [facility]/PHARMACY #[id number] - [place], [place] - [address]    [address] [place] [address]    Phone: [phone] Fax: [phone]    Open 24 Hours?: No      Medication refill instructions:       If your prescription bottle indicates you have medication refills left, it is not necessary to call your provider’s office. Please contact your pharmacy and they will refill your medication.    If your prescription bottle indicates you do not have any refills left, you may request refills at any time through one of the following ways: The online [employer] system (except Urgent Care), by calling your provider’s office, or by asking your pharmacy to contact your provider’s office with a refill request. Medication refills are processed only during regular business hours and may not be available until the next business day. Your provider may request additional information or to have a follow-up visit with you prior to refilling your medication.   *Please Note: Medication refills are assigned a new Rx number when refilled electronically. Your  pharmacy may indicate that no refills were authorized even though a new prescription for the same medication is available at the pharmacy. Please request the medicine by name with the pharmacy before contacting your provider for a refill.        Instructions    1) Continue CPAP at [id number]  2) Clean mask and supplies weekly and change them as insurance allows  3) Return in about 3 months (around 8/5/2017) for Compliance, review of symptoms, if not sooner, follow up with YUSRA Bravo.              Thinkspeed Access Code: [email or web address]  Expires: 5/30/2017 10:40 AM    Thinkspeed  A secure, online tool to manage your health information     [employer]’s Thinkspeed® is a secure, online tool that connects you to your personalized health information from the privacy of your home -- day or night - making it very easy for you to manage your healthcare. Once the activation process is completed, you can even access your medical information using the Thinkspeed isidro, which is available for free in the Apple Isidro store or Google Play store.     Thinkspeed provides the following levels of access (as shown below):   My Chart Features   [facility] Doctor [facility]  Specialists [facility]  Urgent  Care Non-[facility]  Primary Care  Doctor   Email your healthcare team securely and privately 24/7 X X X    Manage appointments: schedule your next appointment; view details of past/upcoming appointments X      Request prescription refills. X      View recent personal medical records, including lab and immunizations X X X X   View health record, including health history, allergies, medications X X X X   Read reports about your outpatient visits, procedures, consult and ER notes X X X X   See your discharge summary, which is a recap of your hospital and/or ER visit that includes your diagnosis, lab results, and care plan. X X       How to register for Thinkspeed:  1. Go to  [email or web address].  2. Click on the Sign Up Now box, which takes  you to the New Member Sign Up page. You will need to provide the following information:  a. Enter your apstrata Access Code exactly as it appears at the top of this page. (You will not need to use this code after you’ve completed the sign-up process. If you do not sign up before the expiration date, you must request a new code.)   b. Enter your date of birth.   c. Enter your home email address.   d. Click Submit, and follow the next screen’s instructions.  3. Create a apstrata ID. This will be your apstrata login ID and cannot be changed, so think of one that is secure and easy to remember.  4. Create a apstrata password. You can change your password at any time.  5. Enter your Password Reset Question and Answer. This can be used at a later time if you forget your password.   6. Enter your e-mail address. This allows you to receive e-mail notifications when new information is available in apstrata.  7. Click Sign Up. You can now view your health information.    For assistance activating your apstrata account, call [phone]        Quit Tobacco Information     Do you want to quit using tobacco?    Quitting tobacco decreases risks of cancer, heart and lung disease, increases life expectancy, improves sense of taste and smell, and increases spending money, among other benefits.    If you are thinking about quitting, we can help.  • [facility] Quit Tobacco Program: 658.175.2450  o Program occurs weekly for four weeks and includes pharmacist consultation on products to support quitting smoking or chewing tobacco. A provider referral is needed for pharmacist consultation.  • Tobacco Users Help Hotline: 9-800-QUIT-NOW (486-4207) or https://[place].quitlogix.org/  o Free, confidential telephone and online coaching for [place] residents. Sessions are designed on a schedule that is convenient for you. Eligible clients receive free nicotine replacement therapy.  • Nationally: www.smokefree.gov  o Information and professional  assistance to support both immediate and long-term needs as you become, and remain, a non-smoker. Smokefree.gov allows you to choose the help that best fits your needs.

## 2017-05-05 NOTE — PATIENT INSTRUCTIONS
1) Continue CPAP at [id number]  2) Clean mask and supplies weekly and change them as insurance allows  3) Return in about 3 months (around 8/5/2017) for Compliance, review of symptoms, if not sooner, follow up with YUSRA Bravo.

## 2017-05-05 NOTE — PROGRESS NOTES
CC:  Here for f/u sleep issues as listed below    HPI:   Terrell presents today for follow up obstructive sleep apnea.  PSG from 1/2017 indicated an AHI of 63 and low oxygen of 71%.  He completed a titration study 3/2017.  He has a hx of TMJ and requesting a referral to a specialist.  He already has been seen by an ENT, but reports he has not been provided treatment and is frustrated. I encouraged a second opinion. We will discuss at next OV. He was also encouraged per last OV to follow-up with his dentist regarding dental appliance for bruxism.  Currently he is being treated with CPAP @ [id number].  Compliance download from the dates 4/5/2017 - 5/4/2017 indicates he is wearing the device 96.7% for an avg of 6 hours and 36 minutes per night with a reduced AHI of 3.5. He was provided a prescription for Ambien 5 mg at last office visit and decided no change with Advil PM. He was reminded holidays with Advil PM.  He does tolerate pressure.  He wishes the mask was bigger, making greater than 1 hour avg of leaks a night.  He wakes up refreshed and denies morning H/A. he sleeps better overall and does not move around like he used to. He continues to nap, but only once a week instead of 3x/week. he will continue to clean supplies weekly and change them as insurance allows.       Patient Active Problem List    Diagnosis Date Noted   • PROSPER (obstructive sleep apnea) 03/20/2017   • Bruxism 01/31/2017   • Snoring 12/02/2016   • Fatigue 12/02/2016   • TMJ (dislocation of temporomandibular joint) 12/02/2016   • HTN (hypertension) 12/02/2016       Past Medical History   Diagnosis Date   • Back pain    • Hepatitis B    • TMJ (dislocation of temporomandibular joint)    • Chickenpox    • Influenza    • Tonsillitis        Past Surgical History   Procedure Laterality Date   • Carpal tunnel release     • Arthroscopy, knee     • Shoulder arthroplasty total     • Ear reconstruction         Family History   Problem Relation Age of Onset   • No  "Known Problems Mother    • Diabetes Father    • No Known Problems Sister    • No Known Problems Brother    • Sleep Apnea Neg Hx    • No Known Problems Sister    • No Known Problems Sister    • No Known Problems Brother    • No Known Problems Brother    • No Known Problems Brother    • No Known Problems Brother    • No Known Problems Daughter    • No Known Problems Son    • No Known Problems Daughter        Social History     Social History   • Marital Status:      Spouse Name: N/A   • Number of Children: N/A   • Years of Education: N/A     Occupational History   • Not on file.     Social History Main Topics   • Smoking status: Current Every Day Smoker -- 0.25 packs/day for 21 years     Types: Cigarettes   • Smokeless tobacco: Never Used   • Alcohol Use: 0.0 - 3.0 oz/week     0-5 Cans of beer per week      Comment: Occasionally   • Drug Use: No   • Sexual Activity: Not on file     Other Topics Concern   • Not on file     Social History Narrative       Current Outpatient Prescriptions   Medication Sig Dispense Refill   • zolpidem (AMBIEN) 5 MG Tab Take 1 Tab by mouth at bedtime as needed for Sleep. 30 Tab 0   • ULORIC 40 MG Tab Take 40 mg by mouth every day.  5   • lisinopril (PRINIVIL) 10 MG Tab Take 10 mg by mouth.  6   • CIALIS 20 MG tablet Take 20 mg by mouth 1 time daily as needed.  6     No current facility-administered medications for this visit.          Allergies: Ibuprofen      ROS   Gen: Denies fever, chills, unintentional weight loss, fatigue  Resp:Denies Dyspnea  CV: Denies chest pain, chest tightness  Sleep:Denies morning headache, insomnia, daytime somnolence, snoring, gasping for air, apnea  Neuro: Denies frequent headaches, weakness, dizziness  See HPI.  All other systems reviewed and negative        Vital signs for this encounter:  Filed Vitals:    05/05/17 0836   Height: 1.753 m (5' 9.02\")   Weight: 100.245 kg (221 lb)   Weight % change since last entry.: 0 %   BP: 132/84   Pulse: 63   BMI " (Calculated): 32.62   Resp: 16   Temp: 36.7 °C (98.1 °F)   O2 sat % room air: 63 %                   Physical Exam:   Gen:         Alert and oriented, No apparent distress.   Neck:        No Lymphadenopathy.  Lungs:     Clear to auscultation bilaterally.    CV:          Regular rate and rhythm. No murmurs, rubs or gallops.   Abd:         Soft non tender, non distended.            Ext:          No clubbing, cyanosis, edema.    Assessment   1. PROSPER (obstructive sleep apnea)         PLAN:   Patient Instructions   1) Continue CPAP at [id number]  2) Clean mask and supplies weekly and change them as insurance allows  3) Return in about 3 months (around 8/5/2017) for Compliance, review of symptoms, if not sooner, follow up with YUSRA Bravo.  4) F/U with [employer] for new mask given there is not a bigger size for jovana view

## 2017-07-18 ENCOUNTER — HOSPITAL ENCOUNTER (OUTPATIENT)
Dept: RADIOLOGY | Facility: MEDICAL CENTER | Age: 62
End: 2017-07-18
Attending: FAMILY MEDICINE
Payer: COMMERCIAL

## 2017-07-18 DIAGNOSIS — M25.561 RIGHT KNEE PAIN, UNSPECIFIED CHRONICITY: ICD-10-CM

## 2017-07-18 DIAGNOSIS — M25.562 LEFT KNEE PAIN, UNSPECIFIED CHRONICITY: ICD-10-CM

## 2017-07-18 PROCEDURE — 73562 X-RAY EXAM OF KNEE 3: CPT | Mod: LT

## 2017-08-16 ENCOUNTER — SLEEP CENTER VISIT (OUTPATIENT)
Dept: SLEEP MEDICINE | Facility: MEDICAL CENTER | Age: 62
End: 2017-08-16
Payer: COMMERCIAL

## 2017-08-16 VITALS
HEART RATE: 65 BPM | HEIGHT: 69 IN | SYSTOLIC BLOOD PRESSURE: 120 MMHG | DIASTOLIC BLOOD PRESSURE: 78 MMHG | OXYGEN SATURATION: 97 % | TEMPERATURE: 97.7 F | RESPIRATION RATE: 16 BRPM | BODY MASS INDEX: 31.25 KG/M2 | WEIGHT: 211 LBS

## 2017-08-16 DIAGNOSIS — G47.33 OSA (OBSTRUCTIVE SLEEP APNEA): ICD-10-CM

## 2017-08-16 PROCEDURE — 99213 OFFICE O/P EST LOW 20 MIN: CPT | Performed by: NURSE PRACTITIONER

## 2017-08-16 NOTE — MR AVS SNAPSHOT
"Terrell LENNY Christopher   2017 8:40 AM   Sleep Center Visit   MRN: [id number]    Department:  [facility]   Dept Phone:  [phone]    Description:  Male : 1955   Provider:  LO Swanson           Reason for Visit     Apnea last seen 17       Allergies as of 2017     Allergen Noted Reactions    Ibuprofen 2016   Hives, Itching      You were diagnosed with     PROSPER (obstructive sleep apnea)   [[id number]]         Vital Signs     Blood Pressure Pulse Temperature Respirations Height Weight    120/78 mmHg 65 36.5 °C (97.7 °F) 16 1.753 m (5' 9\") 95.709 kg (211 lb)    Body Mass Index Oxygen Saturation Smoking Status             31.15 kg/m2 97% Current Every Day Smoker         Basic Information     Date Of Birth Sex Race Ethnicity Preferred Language    1955 Male  or   Origin (Costa Rican,Tajik,Sierra Leonean,Graham, etc) English      Your appointments     2018  8:20 AM   Follow UP with LO Swanson   [facility] (--)    [address] [id number] [phone]              Problem List              ICD-10-CM Priority Class Noted - Resolved    Snoring R06.83   2016 - Present    Fatigue R53.83   2016 - Present    TMJ (dislocation of temporomandibular joint) S03.00XA   2016 - Present    HTN (hypertension) I10   2016 - Present    Bruxism F45.8   2017 - Present    PROSPER (obstructive sleep apnea) G47.33   3/20/2017 - Present      Health Maintenance        Date Due Completion Dates    IMM DTaP/Tdap/Td Vaccine (1 - Tdap) 10/1/1975 ---    COLONOSCOPY 10/1/2006 ---    IMM ZOSTER VACCINE 10/1/2016 ---    IMM INFLUENZA (1) 2017 ---            Current Immunizations     No immunizations on file.      Below and/or attached are the medications your provider expects you to take. Review all of your home medications and newly ordered medications with your provider and/or " pharmacist. Follow medication instructions as directed by your provider and/or pharmacist. Please keep your medication list with you and share with your provider. Update the information when medications are discontinued, doses are changed, or new medications (including over-the-counter products) are added; and carry medication information at all times in the event of emergency situations     Allergies:  IBUPROFEN - Hives,Itching               Medications  Valid as of: August 16, 2017 -  9:02 AM    Generic Name Brand Name Tablet Size Instructions for use    Febuxostat (Tab) ULORIC 40 MG Take 40 mg by mouth every day.        Lisinopril (Tab) PRINIVIL 10 MG Take 10 mg by mouth.        Tadalafil (Tab) CIALIS 20 MG Take 20 mg by mouth 1 time daily as needed.        Zolpidem Tartrate (Tab) AMBIEN 5 MG Take 1 Tab by mouth at bedtime as needed for Sleep.        .                 Medicines prescribed today were sent to:     [facility]/PHARMACY #[id number] - [place], [place] - [address]    [address] [place] [address]    Phone: [phone] Fax: [phone]    Open 24 Hours?: No      Medication refill instructions:       If your prescription bottle indicates you have medication refills left, it is not necessary to call your provider’s office. Please contact your pharmacy and they will refill your medication.    If your prescription bottle indicates you do not have any refills left, you may request refills at any time through one of the following ways: The online [employer] system (except Urgent Care), by calling your provider’s office, or by asking your pharmacy to contact your provider’s office with a refill request. Medication refills are processed only during regular business hours and may not be available until the next business day. Your provider may request additional information or to have a follow-up visit with you prior to refilling your medication.   *Please Note: Medication refills are assigned a new Rx number when  refilled electronically. Your pharmacy may indicate that no refills were authorized even though a new prescription for the same medication is available at the pharmacy. Please request the medicine by name with the pharmacy before contacting your provider for a refill.        Instructions    1) Continue CPAP at [id number]  2) Clean mask and supplies weekly and change them as insurance allows  3)Return in about 6 months (around 2/16/2018) for Compliance, review of symptoms, if not sooner, follow up with YUSRA Barvo.              [email or web address] LLC Access Code: [email or web address]  Expires: 9/15/2017  9:02 AM    HackerTarget.com [employer]  A secure, online tool to manage your health information     [facility]’s HackerTarget.com LLC® is a secure, online tool that connects you to your personalized health information from the privacy of your home -- day or night - making it very easy for you to manage your healthcare. Once the activation process is completed, you can even access your medical information using the Hacker[employer] LLC isidro, which is available for free in the Apple Isidro store or Google Play store.     [employer] LLC provides the following levels of access (as shown below):   My Chart Features   [facility] Primary Care Doctor [facility]  Specialists [facility]  [facility]  [facility]  Doctor   Email your healthcare team securely and privately 24/7 X X X    Manage appointments: schedule your next appointment; view details of past/upcoming appointments X      Request prescription refills. X      View recent personal medical records, including lab and immunizations X X X X   View health record, including health history, allergies, medications X X X X   Read reports about your outpatient visits, procedures, consult and ER notes X X X X   See your discharge summary, which is a recap of your hospital and/or ER visit that includes your diagnosis, lab results, and care plan. X X       How to register for Hacker[employer].com [employer]:  1. Go to  [email or web address].  2. Click on the  Sign Up Now box, which takes you to the New Member Sign Up page. You will need to provide the following information:  a. Enter your Sweetie High Access Code exactly as it appears at the top of this page. (You will not need to use this code after you’ve completed the sign-up process. If you do not sign up before the expiration date, you must request a new code.)   b. Enter your date of birth.   c. Enter your home email address.   d. Click Submit, and follow the next screen’s instructions.  3. Create a Sweetie High ID. This will be your [employer]ie High login ID and cannot be changed, so think of one that is secure and easy to remember.  4. Create a [employer] High password. You can change your password at any time.  5. Enter your Password Reset Question and Answer. This can be used at a later time if you forget your password.   6. Enter your e-mail address. This allows you to receive e-mail notifications when new information is available in Sweetie High.  7. Click Sign Up. You can now view your health information.    For assistance activating your Sweetie High account, call [phone]        Quit Tobacco Information     Do you want to quit using tobacco?    Quitting tobacco decreases risks of cancer, heart and lung disease, increases life expectancy, improves sense of taste and smell, and increases spending money, among other benefits.    If you are thinking about quitting, we can help.  • [facility] Quit Tobacco Program: 590.525.4948  o Program occurs weekly for four weeks and includes pharmacist consultation on products to support quitting smoking or chewing tobacco. A provider referral is needed for pharmacist consultation.  • Tobacco Users Help Hotline: 4-800-QUIT-NOW (751-5810) or https://[place].quitlogix.org/  o Free, confidential telephone and online coaching for [place] residents. Sessions are designed on a schedule that is convenient for you. Eligible clients receive free nicotine replacement therapy.  • Nationally:  www.smokefree.gov  o Information and professional assistance to support both immediate and long-term needs as you become, and remain, a non-smoker. Smokefree.gov allows you to choose the help that best fits your needs.

## 2017-08-16 NOTE — PROGRESS NOTES
CC:  Here for f/u sleep issues as listed below    HPI:   Terrell presents today for follow up obstructive sleep apnea.  PSG from 1/2017 indicated an AHI of 63 and low oxygen of 71%.  He completed a titration study 3/2017.  He has a hx of TMJ that clicks loudly, but does not hurt. He has been seen by a few MDs, but reports treatment has not been warranted.  Currently he is being treated with CPAP @ [id number].  Compliance download from the dates 4/5/2017 - 5/4/2017 indicates he is wearing the device 96.7% for an avg of 6 hours and 50 minutes per night with a reduced AHI of 3.5.  He was provided a prescription for Ambien 5 mg at last office visit and decided no change with Advil PM. He was reminded holidays with Advil PM. He does tolerate pressure and mask well.  He wakes up refreshed and is less tired throughout the day. They deny morning H/A. He sleeps better overall. He continues to have a non purposeful nap 1 hour daily. He will continue to clean supplies weekly and change them as insurance allows.       Patient Active Problem List    Diagnosis Date Noted   • PROSPER (obstructive sleep apnea) 03/20/2017   • Bruxism 01/31/2017   • Snoring 12/02/2016   • Fatigue 12/02/2016   • TMJ (dislocation of temporomandibular joint) 12/02/2016   • HTN (hypertension) 12/02/2016       Past Medical History   Diagnosis Date   • Back pain    • Hepatitis B    • TMJ (dislocation of temporomandibular joint)    • Chickenpox    • Influenza    • Tonsillitis        Past Surgical History   Procedure Laterality Date   • Carpal tunnel release     • Arthroscopy, knee     • Shoulder arthroplasty total     • Ear reconstruction         Family History   Problem Relation Age of Onset   • No Known Problems Mother    • Diabetes Father    • No Known Problems Sister    • No Known Problems Brother    • Sleep Apnea Neg Hx    • No Known Problems Sister    • No Known Problems Sister    • No Known Problems Brother    • No Known Problems Brother    • No Known  "Problems Brother    • No Known Problems Brother    • No Known Problems Daughter    • No Known Problems Son    • No Known Problems Daughter        Social History     Social History   • Marital Status:      Spouse Name: N/A   • Number of Children: N/A   • Years of Education: N/A     Occupational History   • Not on file.     Social History Main Topics   • Smoking status: Current Every Day Smoker -- 0.25 packs/day for 21 years     Types: Cigarettes   • Smokeless tobacco: Never Used   • Alcohol Use: 0.0 - 3.0 oz/week     0-5 Cans of beer per week      Comment: Occasionally   • Drug Use: No   • Sexual Activity: Not on file     Other Topics Concern   • Not on file     Social History Narrative       Current Outpatient Prescriptions   Medication Sig Dispense Refill   • zolpidem (AMBIEN) 5 MG Tab Take 1 Tab by mouth at bedtime as needed for Sleep. 30 Tab 0   • ULORIC 40 MG Tab Take 40 mg by mouth every day.  5   • lisinopril (PRINIVIL) 10 MG Tab Take 10 mg by mouth.  6   • CIALIS 20 MG tablet Take 20 mg by mouth 1 time daily as needed.  6     No current facility-administered medications for this visit.          Allergies: Ibuprofen      ROS   Gen: Denies fever, chills, unintentional weight loss, fatigue  Resp:Denies Dyspnea  CV: Denies chest pain, chest tightness  Sleep:Denies morning headache, insomnia, daytime somnolence, snoring, gasping for air, apnea  Neuro: Denies frequent headaches, weakness, dizziness  See HPI.  All other systems reviewed and negative        Vital signs for this encounter:  Filed Vitals:    08/16/17 0840   Height: 1.753 m (5' 9\")   Weight: 95.709 kg (211 lb)   Weight % change since last entry.: 0 %   BP: 120/78   Pulse: 65   BMI (Calculated): 31.16   Resp: 16   Temp: 36.5 °C (97.7 °F)   O2 sat % room air: 97 %                   Physical Exam:   Gen:         Alert and oriented, No apparent distress.   Neck:        No Lymphadenopathy.  Lungs:     Clear to auscultation bilaterally.    CV:          " Regular rate and rhythm. No murmurs, rubs or gallops.   Abd:         Soft non tender, non distended.            Ext:          No clubbing, cyanosis, edema.    Assessment   1. PROSPER (obstructive sleep apnea)  DME MASK AND SUPPLIES       PLAN:   Patient Instructions   1) Continue CPAP at [id number]  2) Clean mask and supplies weekly and change them as insurance allows  3)Return in about 6 months (around 2/16/2018) for Compliance, review of symptoms, if not sooner, follow up with YUSRA Bravo.

## 2017-08-16 NOTE — PATIENT INSTRUCTIONS
1) Continue CPAP at [id number]  2) Clean mask and supplies weekly and change them as insurance allows  3)Return in about 6 months (around 2/16/2018) for Compliance, review of symptoms, if not sooner, follow up with YUSRA Bravo.

## 2019-02-21 ENCOUNTER — HOSPITAL ENCOUNTER (OUTPATIENT)
Dept: RADIOLOGY | Facility: MEDICAL CENTER | Age: 64
End: 2019-02-21
Attending: FAMILY MEDICINE
Payer: COMMERCIAL

## 2019-02-21 DIAGNOSIS — M54.5 LOW BACK PAIN, UNSPECIFIED BACK PAIN LATERALITY, UNSPECIFIED CHRONICITY, WITH SCIATICA PRESENCE UNSPECIFIED: ICD-10-CM

## 2019-02-21 DIAGNOSIS — M25.551 RIGHT HIP PAIN: ICD-10-CM

## 2019-02-21 PROCEDURE — 72100 X-RAY EXAM L-S SPINE 2/3 VWS: CPT

## 2019-02-21 PROCEDURE — 73502 X-RAY EXAM HIP UNI 2-3 VIEWS: CPT | Mod: RT

## 2019-02-25 ENCOUNTER — HOSPITAL ENCOUNTER (OUTPATIENT)
Dept: LAB | Facility: MEDICAL CENTER | Age: 64
End: 2019-02-25
Attending: FAMILY MEDICINE
Payer: COMMERCIAL

## 2019-02-25 LAB
ALBUMIN SERPL BCP-MCNC: 4.6 G/DL (ref 3.2–4.9)
ALBUMIN/GLOB SERPL: 1.7 G/DL
ALP SERPL-CCNC: 66 U/L (ref 30–99)
ALT SERPL-CCNC: 9 U/L (ref 2–50)
ANION GAP SERPL CALC-SCNC: 6 MMOL/L (ref 0–11.9)
AST SERPL-CCNC: 13 U/L (ref 12–45)
BASOPHILS # BLD AUTO: 0.8 % (ref 0–1.8)
BASOPHILS # BLD: 0.04 K/UL (ref 0–0.12)
BILIRUB SERPL-MCNC: 0.5 MG/DL (ref 0.1–1.5)
BUN SERPL-MCNC: 15 MG/DL (ref 8–22)
CALCIUM SERPL-MCNC: 10.1 MG/DL (ref 8.5–10.5)
CHLORIDE SERPL-SCNC: 107 MMOL/L (ref 96–112)
CHOLEST SERPL-MCNC: 184 MG/DL (ref 100–199)
CO2 SERPL-SCNC: 27 MMOL/L (ref 20–33)
CREAT SERPL-MCNC: 0.87 MG/DL (ref 0.5–1.4)
EOSINOPHIL # BLD AUTO: 0.14 K/UL (ref 0–0.51)
EOSINOPHIL NFR BLD: 2.7 % (ref 0–6.9)
ERYTHROCYTE [DISTWIDTH] IN BLOOD BY AUTOMATED COUNT: 45.5 FL (ref 35.9–50)
FASTING STATUS PATIENT QL REPORTED: NORMAL
GLOBULIN SER CALC-MCNC: 2.7 G/DL (ref 1.9–3.5)
GLUCOSE SERPL-MCNC: 112 MG/DL (ref 65–99)
HCT VFR BLD AUTO: 45.7 % (ref 42–52)
HDLC SERPL-MCNC: 57 MG/DL
HGB BLD-MCNC: 15 G/DL (ref 14–18)
IMM GRANULOCYTES # BLD AUTO: 0.01 K/UL (ref 0–0.11)
IMM GRANULOCYTES NFR BLD AUTO: 0.2 % (ref 0–0.9)
LDLC SERPL CALC-MCNC: 102 MG/DL
LYMPHOCYTES # BLD AUTO: 1.64 K/UL (ref 1–4.8)
LYMPHOCYTES NFR BLD: 31.8 % (ref 22–41)
MCH RBC QN AUTO: 31.6 PG (ref 27–33)
MCHC RBC AUTO-ENTMCNC: 32.8 G/DL (ref 33.7–35.3)
MCV RBC AUTO: 96.2 FL (ref 81.4–97.8)
MONOCYTES # BLD AUTO: 0.43 K/UL (ref 0–0.85)
MONOCYTES NFR BLD AUTO: 8.3 % (ref 0–13.4)
NEUTROPHILS # BLD AUTO: 2.9 K/UL (ref 1.82–7.42)
NEUTROPHILS NFR BLD: 56.2 % (ref 44–72)
NRBC # BLD AUTO: 0 K/UL
NRBC BLD-RTO: 0 /100 WBC
PLATELET # BLD AUTO: 200 K/UL (ref 164–446)
PMV BLD AUTO: 11.5 FL (ref 9–12.9)
POTASSIUM SERPL-SCNC: 5.1 MMOL/L (ref 3.6–5.5)
PROT SERPL-MCNC: 7.3 G/DL (ref 6–8.2)
PSA SERPL-MCNC: 1.21 NG/ML (ref 0–4)
RBC # BLD AUTO: 4.75 M/UL (ref 4.7–6.1)
SODIUM SERPL-SCNC: 140 MMOL/L (ref 135–145)
TRIGL SERPL-MCNC: 126 MG/DL (ref 0–149)
WBC # BLD AUTO: 5.2 K/UL (ref 4.8–10.8)

## 2019-02-25 PROCEDURE — 80061 LIPID PANEL: CPT

## 2019-02-25 PROCEDURE — 80053 COMPREHEN METABOLIC PANEL: CPT

## 2019-02-25 PROCEDURE — 85025 COMPLETE CBC W/AUTO DIFF WBC: CPT

## 2019-02-25 PROCEDURE — 84153 ASSAY OF PSA TOTAL: CPT

## 2019-02-25 PROCEDURE — 36415 COLL VENOUS BLD VENIPUNCTURE: CPT

## 2019-02-25 PROCEDURE — 83036 HEMOGLOBIN GLYCOSYLATED A1C: CPT

## 2019-02-26 LAB
EST. AVERAGE GLUCOSE BLD GHB EST-MCNC: 120 MG/DL
HBA1C MFR BLD: 5.8 % (ref 0–5.6)

## 2019-03-13 ENCOUNTER — PHYSICAL THERAPY (OUTPATIENT)
Dept: PHYSICAL THERAPY | Facility: MEDICAL CENTER | Age: 64
End: 2019-03-13
Attending: FAMILY MEDICINE
Payer: COMMERCIAL

## 2019-03-13 DIAGNOSIS — M54.5 LOW BACK PAIN, UNSPECIFIED BACK PAIN LATERALITY, UNSPECIFIED CHRONICITY, WITH SCIATICA PRESENCE UNSPECIFIED: ICD-10-CM

## 2019-03-13 PROCEDURE — 97162 PT EVAL MOD COMPLEX 30 MIN: CPT

## 2019-03-13 NOTE — OP THERAPY EVALUATION
Outpatient Physical Therapy  INITIAL EVALUATION    [facility] Outpatient Physical Therapy  [address] [place] [address]  Phone:  [phone]  Fax:  [phone]    Date of Evaluation: 03/13/2019    Patient: Terrell White  YOB: 1955  MRN: [id number]     Referring Provider: Tony JOHNSON M.D.  [address] [place] [address]   Referring Diagnosis Low back pain [M54.5]     Time Calculation  Start time: 0200  Stop time: 0300 Time Calculation (min): 60 minutes     Physical Therapy Occurrence Codes    Date of onset of impairment:  2/6/19   Date physical therapy care plan established or reviewed:  3/13/19   Date physical therapy treatment started:  3/13/19          Chief Complaint: Back Problem    Visit Diagnoses     ICD-10-CM   1. Low back pain, unspecified back pain laterality, unspecified chronicity, with sciatica presence unspecified M54.5         Subjective  63 year old male with a chronic lower back pain with DDD recent aggravation 5 weeks ago lifting heavy object.  Pt has previous L TKR 4 years ago  OA R hip  Pt complains of pain R side of lumbar spine radiating into R buttock  Pain increased with sitting > 5 mins  Lying on side,standing > 10 mins and sit to stand or any lifting  Decreased pain with walking and stretches  Pt works as a part time ,hobbies include fishing and camping    Past Medical History:   Diagnosis Date   • Back pain    • Chickenpox    • Hepatitis B    • Influenza    • TMJ (dislocation of temporomandibular joint)    • Tonsillitis      Past Surgical History:   Procedure Laterality Date   • ARTHROSCOPY, KNEE     • CARPAL TUNNEL RELEASE     • EAR RECONSTRUCTION     • SHOULDER ARTHROPLASTY TOTAL       Social History   Substance Use Topics   • Smoking status: Current Every Day Smoker     Packs/day: 0.25     Years: 21.00     Types: Cigarettes   • Smokeless tobacco: Never Used   • Alcohol use 0.0 - 3.0 oz/week       Comment: Occasionally     Family and Occupational History     Social History   • Marital status:      Spouse name: N/A   • Number of children: N/A   • Years of education: N/A       Objective  Increased BMI  R pelvis slightly higher  Pt has an antalgic gait pattern   Lumbar range flexion lacks 25%  Side flex R lacks 50% and painfull  Side flex L lacks 40%   5/5 muscle strength all muscle groups  Poor stabilisation  Tight and painfull R gluteals  Pain on palpation R L5    Exercises/Treatment  Time-based treatments/modalities:   stretches for R gluteals  SCS R L5  stabilisation exc       Assessment, Response and Plan:   Prognosis: good    Goals:   Short Term Goals:   Pt to have undisturbed sleep  Pt to increase sitting tolerance > 10 mins  Short term goal time span:  1-2 weeks      Long Term Goals:    Pt to be able to sitt 30 mins   Pt to be able to sitt to stand painfree  Pt to be independent with HEP and able to demonstrate  Long term goal time span:  2-4 weeks    Plan:   Planned therapy interventions:  Manual Therapy (CPT 19976), Therapeutic Activities (CPT 08543) and Therapeutic Exercise (CPT 16928)  Frequency:  2x week  Duration in weeks:  4  Duration in visits:  8      Functional Limitations and Severity Modifiers      Current:     Goal:       Referring provider co-signature:  I have reviewed this plan of care and my co-signature certifies the need for services.  Certification Dates:   From 03/13/19    To 04/13/19    Physician Signature: ________________________________ Date: ______________

## 2019-03-20 ENCOUNTER — PHYSICAL THERAPY (OUTPATIENT)
Dept: PHYSICAL THERAPY | Facility: MEDICAL CENTER | Age: 64
End: 2019-03-20
Attending: FAMILY MEDICINE
Payer: COMMERCIAL

## 2019-03-20 DIAGNOSIS — M54.5 LOW BACK PAIN, UNSPECIFIED BACK PAIN LATERALITY, UNSPECIFIED CHRONICITY, WITH SCIATICA PRESENCE UNSPECIFIED: ICD-10-CM

## 2019-03-20 PROCEDURE — 97140 MANUAL THERAPY 1/> REGIONS: CPT

## 2019-03-20 PROCEDURE — 97110 THERAPEUTIC EXERCISES: CPT

## 2019-03-20 NOTE — OP THERAPY DAILY TREATMENT
Outpatient Physical Therapy  DAILY TREATMENT     [facility] Outpatient Physical Therapy  [address]  [place] [address]  Phone:  [phone]  Fax:  [phone]    Date: 03/20/2019    Patient: Terrell White  YOB: 1955  MRN: [id number]     Time Calculation  Start time: 0400  Stop time: 0430 Time Calculation (min): 30 minutes     Chief Complaint: No chief complaint on file.    Visit #: 2    SUBJECTIVE:  Good and bad days feels tight after work today    OBJECTIVE:  Current objective measures:     Pt still has a lot of upper body motion with ambulating    Exercises/Treatment  Time-based treatments/modalities:      Stretches and STM R gluteals  stabilisation exc in supine and standing  Gait training  STM paraspinals   Lumbar rot bi lat    ASSESSMENT:   Response to treatment:   No pain following treatment,improved gait  PLAN/RECOMMENDATIONS:   Plan for treatment: therapy treatment to continue next visit.  Planned interventions for next visit: continue with current treatment.

## 2019-03-25 ENCOUNTER — PHYSICAL THERAPY (OUTPATIENT)
Dept: PHYSICAL THERAPY | Facility: MEDICAL CENTER | Age: 64
End: 2019-03-25
Attending: FAMILY MEDICINE
Payer: COMMERCIAL

## 2019-03-25 DIAGNOSIS — M54.5 LOW BACK PAIN, UNSPECIFIED BACK PAIN LATERALITY, UNSPECIFIED CHRONICITY, WITH SCIATICA PRESENCE UNSPECIFIED: ICD-10-CM

## 2019-03-25 PROCEDURE — 97140 MANUAL THERAPY 1/> REGIONS: CPT

## 2019-03-25 PROCEDURE — 97110 THERAPEUTIC EXERCISES: CPT

## 2019-03-25 NOTE — OP THERAPY DAILY TREATMENT
Outpatient Physical Therapy  DAILY TREATMENT     [facility] Outpatient Physical Therapy  [address]  [place] [address]  Phone:  [phone]  Fax:  [phone]    Date: 03/25/2019    Patient: Terrell White  YOB: 1955  MRN: [id number]     Time Calculation  Start time: 0200  Stop time: 0230 Time Calculation (min): 30 minutes     Chief Complaint: No chief complaint on file.    Visit #: 3    SUBJECTIVE:  R gluteal pain at times but overall better    OBJECTIVE:  Current objective measures:     Gait is improved    Exercises/Treatment  Time-based treatments/modalities:   Stabilisation excs supine prone and standing  STM and stretches R gluteals  Lumbar mobs p/A, and rotation        ASSESSMENT:   Response to treatment:   No pain following treatment    PLAN/RECOMMENDATIONS:   Plan for treatment: therapy treatment to continue next visit.  Planned interventions for next visit: continue with current treatment.

## 2019-03-27 ENCOUNTER — PHYSICAL THERAPY (OUTPATIENT)
Dept: PHYSICAL THERAPY | Facility: MEDICAL CENTER | Age: 64
End: 2019-03-27
Attending: FAMILY MEDICINE
Payer: COMMERCIAL

## 2019-03-27 DIAGNOSIS — M54.5 LOW BACK PAIN, UNSPECIFIED BACK PAIN LATERALITY, UNSPECIFIED CHRONICITY, WITH SCIATICA PRESENCE UNSPECIFIED: ICD-10-CM

## 2019-03-27 PROCEDURE — 97140 MANUAL THERAPY 1/> REGIONS: CPT

## 2019-03-27 PROCEDURE — 97110 THERAPEUTIC EXERCISES: CPT

## 2019-03-27 NOTE — OP THERAPY DAILY TREATMENT
Outpatient Physical Therapy  DAILY TREATMENT     [facility] Outpatient Physical Therapy  [address]  [place] [address]  Phone:  [phone]  Fax:  [phone]    Date: 03/27/2019    Patient: Terrell White  YOB: 1955  MRN: [id number]     Time Calculation  Start time: 0830  Stop time: 0900 Time Calculation (min): 30 minutes     Chief Complaint: No chief complaint on file.    Visit #: 4    SUBJECTIVE:  Pt c/o occasional R sided twinges this am    OBJECTIVE:  Current objective measures:    alignment looks good  Gait is improved when Pt is concentrating    Exercises/Treatment  Time-based treatments/modalities:   Stabilisation exc in supine and standing  R gluteal stretches  STM R gluteals  R EIL x 10 with overpressure  P/A L 1-5    ASSESSMENT:   Response to treatment:   No pain following treatment    PLAN/RECOMMENDATIONS:   Plan for treatment: therapy treatment to continue next visit.  Planned interventions for next visit: continue with current treatment.

## 2019-04-01 ENCOUNTER — PHYSICAL THERAPY (OUTPATIENT)
Dept: PHYSICAL THERAPY | Facility: MEDICAL CENTER | Age: 64
End: 2019-04-01
Attending: FAMILY MEDICINE
Payer: COMMERCIAL

## 2019-04-01 DIAGNOSIS — M54.5 LOW BACK PAIN, UNSPECIFIED BACK PAIN LATERALITY, UNSPECIFIED CHRONICITY, WITH SCIATICA PRESENCE UNSPECIFIED: ICD-10-CM

## 2019-04-01 PROCEDURE — 97140 MANUAL THERAPY 1/> REGIONS: CPT

## 2019-04-01 PROCEDURE — 97110 THERAPEUTIC EXERCISES: CPT

## 2019-04-01 NOTE — OP THERAPY DAILY TREATMENT
Outpatient Physical Therapy  DAILY TREATMENT     [facility] Outpatient Physical Therapy  [address]  [place] [address]  Phone:  [phone]  Fax:  [phone]    Date: 04/01/2019    Patient: Terrell White  YOB: 1955  MRN: [id number]     Time Calculation  Start time: 0830  Stop time: 0900 Time Calculation (min): 30 minutes     Chief Complaint: No chief complaint on file.    Visit #: 5    SUBJECTIVE:  Overall improving occasional R lower lumbar twinges    OBJECTIVE:  Current objective measures:     alignment looks good    Exercises/Treatment  Time-based treatments/modalities:   Stabilisation exc prone ,supine and standing  STM paraspinals and R gluteals  Stretches Gluteals  REIL with overpressure    ASSESSMENT:   Response to treatment:   Improved stabilisation with gait  Pt is on vacation for a week    PLAN/RECOMMENDATIONS:   Plan for treatment: therapy treatment to continue next visit.  Planned interventions for next visit: continue with current treatment.

## 2019-04-10 ENCOUNTER — PHYSICAL THERAPY (OUTPATIENT)
Dept: PHYSICAL THERAPY | Facility: MEDICAL CENTER | Age: 64
End: 2019-04-10
Attending: FAMILY MEDICINE
Payer: COMMERCIAL

## 2019-04-10 DIAGNOSIS — M54.5 LOW BACK PAIN, UNSPECIFIED BACK PAIN LATERALITY, UNSPECIFIED CHRONICITY, WITH SCIATICA PRESENCE UNSPECIFIED: ICD-10-CM

## 2019-04-10 PROCEDURE — 97140 MANUAL THERAPY 1/> REGIONS: CPT

## 2019-04-10 PROCEDURE — 97110 THERAPEUTIC EXERCISES: CPT

## 2019-04-15 ENCOUNTER — PHYSICAL THERAPY (OUTPATIENT)
Dept: PHYSICAL THERAPY | Facility: MEDICAL CENTER | Age: 64
End: 2019-04-15
Attending: FAMILY MEDICINE
Payer: COMMERCIAL

## 2019-04-15 DIAGNOSIS — M54.5 LOW BACK PAIN, UNSPECIFIED BACK PAIN LATERALITY, UNSPECIFIED CHRONICITY, WITH SCIATICA PRESENCE UNSPECIFIED: ICD-10-CM

## 2019-04-15 PROCEDURE — 97140 MANUAL THERAPY 1/> REGIONS: CPT

## 2019-04-15 PROCEDURE — 97110 THERAPEUTIC EXERCISES: CPT

## 2019-04-17 ENCOUNTER — PHYSICAL THERAPY (OUTPATIENT)
Dept: PHYSICAL THERAPY | Facility: MEDICAL CENTER | Age: 64
End: 2019-04-17
Attending: FAMILY MEDICINE
Payer: COMMERCIAL

## 2019-04-17 DIAGNOSIS — M54.5 LOW BACK PAIN, UNSPECIFIED BACK PAIN LATERALITY, UNSPECIFIED CHRONICITY, WITH SCIATICA PRESENCE UNSPECIFIED: ICD-10-CM

## 2019-04-17 PROCEDURE — 97110 THERAPEUTIC EXERCISES: CPT

## 2019-04-17 PROCEDURE — 97140 MANUAL THERAPY 1/> REGIONS: CPT

## 2019-04-18 NOTE — OP THERAPY DAILY TREATMENT
Outpatient Physical Therapy  DAILY TREATMENT     [facility] Out[facility]  [address]  [place] [address]  Phone:  [phone]  Fax:  [phone]    Date: 04/15/2019    Patient: Terrell White  YOB: 1955  MRN: [id number]     Time Calculation  Start time: 0130  Stop time: 0200 Time Calculation (min): 30 minutes     Chief Complaint: No chief complaint on file.    Visit #: 7    SUBJECTIVE:  Continues to feel good minimal pain    OBJECTIVE:  Current objective measures:     alignment looks good    Exercises/Treatment  Time-based treatments/modalities:   Stabilisation exc prone supine and standing  Stretches R gluteals  STM gluteals  STM paraspinals    REIL x 10    ASSESSMENT:   Response to treatment:  Pt cont to do well  PLAN/RECOMMENDATIONS:   Plan for treatment: therapy treatment to continue next visit.  Planned interventions for next visit: continue with current treatment.

## 2019-04-18 NOTE — OP THERAPY DAILY TREATMENT
Outpatient Physical Therapy  DAILY TREATMENT     [facility] Outpatient Physical Therapy  [address]  [place] [address]  Phone:  [phone]  Fax:  [phone]    Date: 04/17/2019    Patient: Terrell White  YOB: 1955  MRN: [id number]     Time Calculation  Start time: 0930  Stop time: 1000 Time Calculation (min): 30 minutes     Chief Complaint: No chief complaint on file.    Visit #: 8    SUBJECTIVE:  Cont to do well minimal pain    OBJECTIVE:  Current objective measures:         Exercises/Treatment  Time-based treatments/modalities:  Manual therapy minutes (CPT 36783): 15 minutes  Therapeutic exercise minutes (CPT 69941): 15 minutes   Reviewd HEP  stabilisation exc    ASSESSMENT:   Response to treatment:   Pt doing really well D/C on HEP  PLAN/RECOMMENDATIONS:   Plan for treatment: discharge patient due to accomplished goals.  .

## 2019-09-21 ENCOUNTER — OFFICE VISIT (OUTPATIENT)
Dept: URGENT CARE | Facility: CLINIC | Age: 64
End: 2019-09-21
Payer: COMMERCIAL

## 2019-09-21 VITALS
TEMPERATURE: 98.2 F | WEIGHT: 212 LBS | HEIGHT: 69 IN | BODY MASS INDEX: 31.4 KG/M2 | HEART RATE: 84 BPM | OXYGEN SATURATION: 98 % | DIASTOLIC BLOOD PRESSURE: 76 MMHG | SYSTOLIC BLOOD PRESSURE: 110 MMHG | RESPIRATION RATE: 16 BRPM

## 2019-09-21 DIAGNOSIS — J30.9 ALLERGIC RHINITIS, UNSPECIFIED SEASONALITY, UNSPECIFIED TRIGGER: ICD-10-CM

## 2019-09-21 DIAGNOSIS — J01.40 ACUTE PANSINUSITIS, RECURRENCE NOT SPECIFIED: ICD-10-CM

## 2019-09-21 PROCEDURE — 99214 OFFICE O/P EST MOD 30 MIN: CPT | Performed by: NURSE PRACTITIONER

## 2019-09-21 RX ORDER — AZELASTINE 1 MG/ML
1 SPRAY, METERED NASAL 2 TIMES DAILY
Qty: 30 ML | Refills: 0 | Status: ON HOLD | OUTPATIENT
Start: 2019-09-21 | End: 2019-12-19

## 2019-09-21 RX ORDER — AMOXICILLIN AND CLAVULANATE POTASSIUM 875; 125 MG/1; MG/1
1 TABLET, FILM COATED ORAL
Refills: 0 | Status: ON HOLD | COMMUNITY
Start: 2019-08-30 | End: 2019-12-19

## 2019-09-21 RX ORDER — METHYLPREDNISOLONE 4 MG/1
TABLET ORAL
Qty: 21 TAB | Refills: 0 | Status: ON HOLD | OUTPATIENT
Start: 2019-09-21 | End: 2019-12-19

## 2019-09-21 ASSESSMENT — LIFESTYLE VARIABLES: SUBSTANCE_ABUSE: 0

## 2019-09-21 ASSESSMENT — ENCOUNTER SYMPTOMS
SHORTNESS OF BREATH: 0
SORE THROAT: 1
COUGH: 0
HEADACHES: 1
SENSORY CHANGE: 0
DIAPHORESIS: 0
NECK PAIN: 0
BRUISES/BLEEDS EASILY: 0
CHILLS: 0
SINUS PAIN: 1
SPUTUM PRODUCTION: 0
EYE PAIN: 0
DIZZINESS: 0
NAUSEA: 0
EYE DISCHARGE: 1
WEIGHT LOSS: 0
VOMITING: 0

## 2019-09-21 NOTE — PROGRESS NOTES
"Subjective:      Terrell White is a 63 y.o. male who presents with Sinus Problem (pain & pressure in sinuses, post nasal drip & congestion for over a month)    Reviewed past medical, surgical and family history. Reviewed prescription and OTC medications with patient in electronic health record today      Allergies   Allergen Reactions   • Ibuprofen Hives and Itching             HPI  This is a new problem. C/o sinus pain and pressure x 'months\". + env. Allergies. + runny nose \" all the time\". + sinus pain and pressure, + sneezing, + headache, + watery eye drainage. \" I just cant stand it anymore. Wears CPAP mask at night without humidifcation.  Treatment tried;  Uses antihistamine daily, intermittent flonase. He has appt with ENT in December 2019    No other aggravating or alleviating factors.       Review of Systems   Constitutional: Positive for malaise/fatigue. Negative for chills, diaphoresis and weight loss.   HENT: Positive for congestion, sinus pain and sore throat (from drainage). Negative for ear discharge, ear pain and tinnitus.    Eyes: Positive for discharge. Negative for pain.   Respiratory: Negative for cough, sputum production and shortness of breath.    Cardiovascular: Negative for chest pain.   Gastrointestinal: Negative for nausea and vomiting.   Musculoskeletal: Negative for neck pain.   Neurological: Positive for headaches. Negative for dizziness and sensory change.   Endo/Heme/Allergies: Negative for environmental allergies. Does not bruise/bleed easily.   Psychiatric/Behavioral: Negative for substance abuse.          Objective:     /76   Pulse 84   Temp 36.8 °C (98.2 °F) (Temporal)   Resp 16   Ht 1.753 m (5' 9\")   Wt 96.2 kg (212 lb)   SpO2 98%   BMI 31.31 kg/m²      Physical Exam   Constitutional: Vital signs are normal. He appears well-developed and well-nourished.  Non-toxic appearance. He does not have a sickly appearance.   HENT:   Head: Normocephalic.   Right Ear: " Hearing, tympanic membrane, external ear and ear canal normal.   Left Ear: Hearing, tympanic membrane, external ear and ear canal normal.   Nose: Mucosal edema, rhinorrhea and sinus tenderness present. Right sinus exhibits maxillary sinus tenderness and frontal sinus tenderness. Left sinus exhibits maxillary sinus tenderness and frontal sinus tenderness.   Mouth/Throat: Uvula is midline and mucous membranes are normal. Oropharyngeal exudate (PND) present. No posterior oropharyngeal edema, posterior oropharyngeal erythema or tonsillar abscesses.   Eyes: Pupils are equal, round, and reactive to light. Conjunctivae and lids are normal. Right eye exhibits discharge (clear watery). Left eye exhibits discharge (clear watery).   Neck: Trachea normal, normal range of motion and full passive range of motion without pain. Neck supple.   Cardiovascular: Normal rate, regular rhythm, intact distal pulses and normal pulses. PMI is not displaced.   Pulmonary/Chest: Effort normal and breath sounds normal.   Abdominal: Soft.   Lymphadenopathy:        Head (right side): No submental, no submandibular and no tonsillar adenopathy present.        Head (left side): No submental, no submandibular and no tonsillar adenopathy present.     He has no cervical adenopathy.        Right: No supraclavicular adenopathy present.        Left: No supraclavicular adenopathy present.   Neurological: He is alert.   Skin: Skin is warm, dry and intact. Capillary refill takes less than 2 seconds.   Psychiatric: He has a normal mood and affect. His speech is normal.   Nursing note and vitals reviewed.              Assessment/Plan:     1. Acute pansinusitis, recurrence not specified  REFERRAL TO ENT    methylPREDNISolone (MEDROL DOSEPAK) 4 MG Tablet Therapy Pack   2. Allergic rhinitis, unspecified seasonality, unspecified trigger  REFERRAL TO ENT    azelastine (ASTELIN) 137 MCG/SPRAY nasal spray    methylPREDNISolone (MEDROL DOSEPAK) 4 MG Tablet Therapy Pack        FU with PCP or return to Urgent Care in 5-7 days if no improvement in symptoms, sooner if increased or worsening symptoms.   Humidifier at noc may be beneficial on Cpap machine if possible. .   OTC antihistamine of choice - non-drowsy. Take as directed by   OTC fluticasone of choice- Use daily .  Take as directed by   Keep well hydrated  OTC Saline irrigations or Neti pot rinse. Follow manufacturers recommendations.       Educated in proper administration of medication(s) ordered today including safety, possible SE, risks, benefits, rationale and alternatives to therapy.

## 2019-12-17 DIAGNOSIS — Z01.812 PRE-OPERATIVE LABORATORY EXAMINATION: ICD-10-CM

## 2019-12-17 DIAGNOSIS — Z01.810 PRE-OPERATIVE CARDIOVASCULAR EXAMINATION: ICD-10-CM

## 2019-12-17 LAB
ANION GAP SERPL CALC-SCNC: 12 MMOL/L (ref 0–11.9)
APPEARANCE UR: CLEAR
BACTERIA #/AREA URNS HPF: NEGATIVE /HPF
BASOPHILS # BLD AUTO: 0.7 % (ref 0–1.8)
BASOPHILS # BLD: 0.04 K/UL (ref 0–0.12)
BILIRUB UR QL STRIP.AUTO: NEGATIVE
BUN SERPL-MCNC: 11 MG/DL (ref 8–22)
CALCIUM SERPL-MCNC: 9.7 MG/DL (ref 8.5–10.5)
CHLORIDE SERPL-SCNC: 104 MMOL/L (ref 96–112)
CO2 SERPL-SCNC: 25 MMOL/L (ref 20–33)
COLOR UR: YELLOW
CREAT SERPL-MCNC: 0.72 MG/DL (ref 0.5–1.4)
EKG IMPRESSION: NORMAL
EOSINOPHIL # BLD AUTO: 0.14 K/UL (ref 0–0.51)
EOSINOPHIL NFR BLD: 2.5 % (ref 0–6.9)
EPI CELLS #/AREA URNS HPF: NEGATIVE /HPF
ERYTHROCYTE [DISTWIDTH] IN BLOOD BY AUTOMATED COUNT: 41.6 FL (ref 35.9–50)
GLUCOSE SERPL-MCNC: 108 MG/DL (ref 65–99)
GLUCOSE UR STRIP.AUTO-MCNC: NEGATIVE MG/DL
HCT VFR BLD AUTO: 44.6 % (ref 42–52)
HGB BLD-MCNC: 15.3 G/DL (ref 14–18)
HIV 1+2 AB+HIV1 P24 AG SERPL QL IA: NON REACTIVE
HYALINE CASTS #/AREA URNS LPF: ABNORMAL /LPF
IMM GRANULOCYTES # BLD AUTO: 0.01 K/UL (ref 0–0.11)
IMM GRANULOCYTES NFR BLD AUTO: 0.2 % (ref 0–0.9)
KETONES UR STRIP.AUTO-MCNC: NEGATIVE MG/DL
LEUKOCYTE ESTERASE UR QL STRIP.AUTO: NEGATIVE
LYMPHOCYTES # BLD AUTO: 1.92 K/UL (ref 1–4.8)
LYMPHOCYTES NFR BLD: 33.8 % (ref 22–41)
MCH RBC QN AUTO: 32.3 PG (ref 27–33)
MCHC RBC AUTO-ENTMCNC: 34.3 G/DL (ref 33.7–35.3)
MCV RBC AUTO: 94.1 FL (ref 81.4–97.8)
MICRO URNS: ABNORMAL
MONOCYTES # BLD AUTO: 0.49 K/UL (ref 0–0.85)
MONOCYTES NFR BLD AUTO: 8.6 % (ref 0–13.4)
MUCOUS THREADS #/AREA URNS HPF: ABNORMAL /HPF
NEUTROPHILS # BLD AUTO: 3.08 K/UL (ref 1.82–7.42)
NEUTROPHILS NFR BLD: 54.2 % (ref 44–72)
NITRITE UR QL STRIP.AUTO: NEGATIVE
NRBC # BLD AUTO: 0 K/UL
NRBC BLD-RTO: 0 /100 WBC
PH UR STRIP.AUTO: 6 [PH] (ref 5–8)
PLATELET # BLD AUTO: 202 K/UL (ref 164–446)
PMV BLD AUTO: 10.4 FL (ref 9–12.9)
POTASSIUM SERPL-SCNC: 3.9 MMOL/L (ref 3.6–5.5)
PROT UR QL STRIP: NEGATIVE MG/DL
RBC # BLD AUTO: 4.74 M/UL (ref 4.7–6.1)
RBC # URNS HPF: ABNORMAL /HPF
RBC UR QL AUTO: ABNORMAL
SCCMEC + MECA PNL NOSE NAA+PROBE: NEGATIVE
SCCMEC + MECA PNL NOSE NAA+PROBE: NEGATIVE
SODIUM SERPL-SCNC: 141 MMOL/L (ref 135–145)
SP GR UR STRIP.AUTO: 1.02
UROBILINOGEN UR STRIP.AUTO-MCNC: 1 MG/DL
WBC # BLD AUTO: 5.7 K/UL (ref 4.8–10.8)
WBC #/AREA URNS HPF: ABNORMAL /HPF

## 2019-12-17 PROCEDURE — 87389 HIV-1 AG W/HIV-1&-2 AB AG IA: CPT

## 2019-12-17 PROCEDURE — 36415 COLL VENOUS BLD VENIPUNCTURE: CPT

## 2019-12-17 PROCEDURE — 87641 MR-STAPH DNA AMP PROBE: CPT

## 2019-12-17 PROCEDURE — 81001 URINALYSIS AUTO W/SCOPE: CPT

## 2019-12-17 PROCEDURE — 87640 STAPH A DNA AMP PROBE: CPT

## 2019-12-17 PROCEDURE — 80048 BASIC METABOLIC PNL TOTAL CA: CPT

## 2019-12-17 PROCEDURE — 85025 COMPLETE CBC W/AUTO DIFF WBC: CPT

## 2019-12-17 PROCEDURE — 93005 ELECTROCARDIOGRAM TRACING: CPT

## 2019-12-17 PROCEDURE — 93010 ELECTROCARDIOGRAM REPORT: CPT | Performed by: INTERNAL MEDICINE

## 2019-12-17 NOTE — DISCHARGE PLANNING
DISCHARGE PLANNING NOTE - TOTAL JOINT     Procedure: Procedure(s):  ARTHROPLASTY, HIP, TOTAL, ANTERIOR APPROACH  Procedure Date: 12/19/2019  Insurance:  Payor: [employer] / Plan: [facility]  / Product Type: *No Product type* /   Equipment currently available at home? cane, crutches, front-wheel walker, raised toilet seat and shower chair  Steps into the home? 1  Steps within the home? 0  Toilet height? Standard  Type of shower? walk-in shower  Who will be with you during your recovery? spouse  Is Outpatient Physical Therapy set up after surgery? No   Did you take the Total Joint Class and where? No     Plan: Anticipate home. Equipment list/homesafety checklist given and reviewed. [facility] manual given and total joint classes/online class flyer given.

## 2019-12-17 NOTE — OR NURSING
Patient here for pre-admit appointment. Labs/testing done as ordered. Patient educated as to what to expect if MRSA result is positive vs negative. To see Shahnaz PATE case coordination for medical equipment needs.

## 2019-12-19 ENCOUNTER — ANESTHESIA EVENT (OUTPATIENT)
Dept: SURGERY | Facility: MEDICAL CENTER | Age: 64
DRG: 470 | End: 2019-12-19
Payer: COMMERCIAL

## 2019-12-19 ENCOUNTER — ANESTHESIA (OUTPATIENT)
Dept: SURGERY | Facility: MEDICAL CENTER | Age: 64
DRG: 470 | End: 2019-12-19
Payer: COMMERCIAL

## 2019-12-19 ENCOUNTER — APPOINTMENT (OUTPATIENT)
Dept: RADIOLOGY | Facility: MEDICAL CENTER | Age: 64
DRG: 470 | End: 2019-12-19
Attending: ORTHOPAEDIC SURGERY
Payer: COMMERCIAL

## 2019-12-19 ENCOUNTER — HOSPITAL ENCOUNTER (INPATIENT)
Facility: MEDICAL CENTER | Age: 64
LOS: 1 days | DRG: 470 | End: 2019-12-20
Attending: ORTHOPAEDIC SURGERY | Admitting: ORTHOPAEDIC SURGERY
Payer: COMMERCIAL

## 2019-12-19 DIAGNOSIS — Z96.641 STATUS POST RIGHT HIP REPLACEMENT: ICD-10-CM

## 2019-12-19 PROCEDURE — 700105 HCHG RX REV CODE 258: Performed by: ORTHOPAEDIC SURGERY

## 2019-12-19 PROCEDURE — 0SR906Z REPLACEMENT OF RIGHT HIP JOINT WITH OXIDIZED ZIRCONIUM ON POLYETHYLENE SYNTHETIC SUBSTITUTE, OPEN APPROACH: ICD-10-PCS | Performed by: ORTHOPAEDIC SURGERY

## 2019-12-19 PROCEDURE — A6402 STERILE GAUZE <= 16 SQ IN: HCPCS | Performed by: ORTHOPAEDIC SURGERY

## 2019-12-19 PROCEDURE — 501838 HCHG SUTURE GENERAL: Performed by: ORTHOPAEDIC SURGERY

## 2019-12-19 PROCEDURE — 97165 OT EVAL LOW COMPLEX 30 MIN: CPT

## 2019-12-19 PROCEDURE — 160002 HCHG RECOVERY MINUTES (STAT): Performed by: ORTHOPAEDIC SURGERY

## 2019-12-19 PROCEDURE — 700111 HCHG RX REV CODE 636 W/ 250 OVERRIDE (IP): Performed by: PHYSICIAN ASSISTANT

## 2019-12-19 PROCEDURE — A9270 NON-COVERED ITEM OR SERVICE: HCPCS | Performed by: ORTHOPAEDIC SURGERY

## 2019-12-19 PROCEDURE — 160042 HCHG SURGERY MINUTES - EA ADDL 1 MIN LEVEL 5: Performed by: ORTHOPAEDIC SURGERY

## 2019-12-19 PROCEDURE — 700101 HCHG RX REV CODE 250: Performed by: ANESTHESIOLOGY

## 2019-12-19 PROCEDURE — 500002 HCHG ADHESIVE, DERMABOND: Performed by: ORTHOPAEDIC SURGERY

## 2019-12-19 PROCEDURE — 700111 HCHG RX REV CODE 636 W/ 250 OVERRIDE (IP): Performed by: ANESTHESIOLOGY

## 2019-12-19 PROCEDURE — 770001 HCHG ROOM/CARE - MED/SURG/GYN PRIV*

## 2019-12-19 PROCEDURE — 160036 HCHG PACU - EA ADDL 30 MINS PHASE I: Performed by: ORTHOPAEDIC SURGERY

## 2019-12-19 PROCEDURE — 700101 HCHG RX REV CODE 250: Performed by: ORTHOPAEDIC SURGERY

## 2019-12-19 PROCEDURE — A9270 NON-COVERED ITEM OR SERVICE: HCPCS | Performed by: PHYSICIAN ASSISTANT

## 2019-12-19 PROCEDURE — 700102 HCHG RX REV CODE 250 W/ 637 OVERRIDE(OP): Performed by: ORTHOPAEDIC SURGERY

## 2019-12-19 PROCEDURE — 160035 HCHG PACU - 1ST 60 MINS PHASE I: Performed by: ORTHOPAEDIC SURGERY

## 2019-12-19 PROCEDURE — 700101 HCHG RX REV CODE 250: Performed by: PHYSICIAN ASSISTANT

## 2019-12-19 PROCEDURE — 700111 HCHG RX REV CODE 636 W/ 250 OVERRIDE (IP): Performed by: ORTHOPAEDIC SURGERY

## 2019-12-19 PROCEDURE — 160048 HCHG OR STATISTICAL LEVEL 1-5: Performed by: ORTHOPAEDIC SURGERY

## 2019-12-19 PROCEDURE — 700111 HCHG RX REV CODE 636 W/ 250 OVERRIDE (IP)

## 2019-12-19 PROCEDURE — 700102 HCHG RX REV CODE 250 W/ 637 OVERRIDE(OP): Performed by: ANESTHESIOLOGY

## 2019-12-19 PROCEDURE — 501445 HCHG STAPLER, SKIN DISP: Performed by: ORTHOPAEDIC SURGERY

## 2019-12-19 PROCEDURE — 700112 HCHG RX REV CODE 229: Performed by: PHYSICIAN ASSISTANT

## 2019-12-19 PROCEDURE — 502578 HCHG PACK, TOTAL HIP: Performed by: ORTHOPAEDIC SURGERY

## 2019-12-19 PROCEDURE — 160031 HCHG SURGERY MINUTES - 1ST 30 MINS LEVEL 5: Performed by: ORTHOPAEDIC SURGERY

## 2019-12-19 PROCEDURE — 502000 HCHG MISC OR IMPLANTS RC 0278: Performed by: ORTHOPAEDIC SURGERY

## 2019-12-19 PROCEDURE — A9270 NON-COVERED ITEM OR SERVICE: HCPCS | Performed by: ANESTHESIOLOGY

## 2019-12-19 PROCEDURE — 160009 HCHG ANES TIME/MIN: Performed by: ORTHOPAEDIC SURGERY

## 2019-12-19 DEVICE — IMPLANT REF SPHER HEAD SCREW 35MM (1EA): Type: IMPLANTABLE DEVICE | Site: HIP | Status: FUNCTIONAL

## 2019-12-19 DEVICE — IMPLANT OXINIUM FEM HD 12/14 36 MM L/+8 (1EA): Type: IMPLANTABLE DEVICE | Site: HIP | Status: FUNCTIONAL

## 2019-12-19 DEVICE — IMPLANTABLE DEVICE: Type: IMPLANTABLE DEVICE | Site: HIP | Status: FUNCTIONAL

## 2019-12-19 DEVICE — IMPLANT R3 3 HOLE ACET SHELL 54MM (1EA): Type: IMPLANTABLE DEVICE | Site: HIP | Status: FUNCTIONAL

## 2019-12-19 DEVICE — IMPLANT REF SPHER HEAD SCREW 15MM (1EA): Type: IMPLANTABLE DEVICE | Site: HIP | Status: FUNCTIONAL

## 2019-12-19 DEVICE — IMPLANT REF THREADED HOLE COVER (1EA): Type: IMPLANTABLE DEVICE | Site: HIP | Status: FUNCTIONAL

## 2019-12-19 DEVICE — LINER ACETABULAR R3 ODEG XLPE 36MMX54MM (1EA): Type: IMPLANTABLE DEVICE | Site: HIP | Status: FUNCTIONAL

## 2019-12-19 RX ORDER — POLYETHYLENE GLYCOL 3350 17 G/17G
1 POWDER, FOR SOLUTION ORAL 2 TIMES DAILY PRN
Status: DISCONTINUED | OUTPATIENT
Start: 2019-12-19 | End: 2019-12-20 | Stop reason: HOSPADM

## 2019-12-19 RX ORDER — LIDOCAINE HYDROCHLORIDE 20 MG/ML
INJECTION, SOLUTION EPIDURAL; INFILTRATION; INTRACAUDAL; PERINEURAL PRN
Status: DISCONTINUED | OUTPATIENT
Start: 2019-12-19 | End: 2019-12-19 | Stop reason: SURG

## 2019-12-19 RX ORDER — SODIUM CHLORIDE, SODIUM LACTATE, POTASSIUM CHLORIDE, CALCIUM CHLORIDE 600; 310; 30; 20 MG/100ML; MG/100ML; MG/100ML; MG/100ML
INJECTION, SOLUTION INTRAVENOUS CONTINUOUS
Status: DISCONTINUED | OUTPATIENT
Start: 2019-12-19 | End: 2019-12-19 | Stop reason: HOSPADM

## 2019-12-19 RX ORDER — OXYCODONE HCL 5 MG/5 ML
5 SOLUTION, ORAL ORAL
Status: COMPLETED | OUTPATIENT
Start: 2019-12-19 | End: 2019-12-19

## 2019-12-19 RX ORDER — ONDANSETRON 2 MG/ML
4 INJECTION INTRAMUSCULAR; INTRAVENOUS EVERY 4 HOURS PRN
Status: DISCONTINUED | OUTPATIENT
Start: 2019-12-19 | End: 2019-12-20 | Stop reason: HOSPADM

## 2019-12-19 RX ORDER — HALOPERIDOL 5 MG/ML
1 INJECTION INTRAMUSCULAR EVERY 6 HOURS PRN
Status: DISCONTINUED | OUTPATIENT
Start: 2019-12-19 | End: 2019-12-20 | Stop reason: HOSPADM

## 2019-12-19 RX ORDER — ENEMA 19; 7 G/133ML; G/133ML
1 ENEMA RECTAL
Status: DISCONTINUED | OUTPATIENT
Start: 2019-12-19 | End: 2019-12-20 | Stop reason: HOSPADM

## 2019-12-19 RX ORDER — AMOXICILLIN 250 MG
1 CAPSULE ORAL NIGHTLY
Status: DISCONTINUED | OUTPATIENT
Start: 2019-12-19 | End: 2019-12-20 | Stop reason: HOSPADM

## 2019-12-19 RX ORDER — DIPHENHYDRAMINE HYDROCHLORIDE 50 MG/ML
25 INJECTION INTRAMUSCULAR; INTRAVENOUS EVERY 6 HOURS PRN
Status: DISCONTINUED | OUTPATIENT
Start: 2019-12-19 | End: 2019-12-20 | Stop reason: HOSPADM

## 2019-12-19 RX ORDER — MEPERIDINE HYDROCHLORIDE 25 MG/ML
12.5 INJECTION INTRAMUSCULAR; INTRAVENOUS; SUBCUTANEOUS
Status: DISCONTINUED | OUTPATIENT
Start: 2019-12-19 | End: 2019-12-19 | Stop reason: HOSPADM

## 2019-12-19 RX ORDER — CEFAZOLIN SODIUM 2 G/100ML
2 INJECTION, SOLUTION INTRAVENOUS
Status: DISCONTINUED | OUTPATIENT
Start: 2019-12-19 | End: 2019-12-19 | Stop reason: HOSPADM

## 2019-12-19 RX ORDER — MORPHINE SULFATE 4 MG/ML
4 INJECTION, SOLUTION INTRAMUSCULAR; INTRAVENOUS
Status: DISCONTINUED | OUTPATIENT
Start: 2019-12-19 | End: 2019-12-20 | Stop reason: HOSPADM

## 2019-12-19 RX ORDER — HYDROMORPHONE HYDROCHLORIDE 1 MG/ML
0.2 INJECTION, SOLUTION INTRAMUSCULAR; INTRAVENOUS; SUBCUTANEOUS
Status: DISCONTINUED | OUTPATIENT
Start: 2019-12-19 | End: 2019-12-19 | Stop reason: HOSPADM

## 2019-12-19 RX ORDER — COVID-19 ANTIGEN TEST
440 KIT MISCELLANEOUS
COMMUNITY
End: 2023-05-18

## 2019-12-19 RX ORDER — HYDROMORPHONE HYDROCHLORIDE 1 MG/ML
0.5 INJECTION, SOLUTION INTRAMUSCULAR; INTRAVENOUS; SUBCUTANEOUS
Status: DISCONTINUED | OUTPATIENT
Start: 2019-12-19 | End: 2019-12-19 | Stop reason: HOSPADM

## 2019-12-19 RX ORDER — ONDANSETRON 2 MG/ML
4 INJECTION INTRAMUSCULAR; INTRAVENOUS
Status: DISCONTINUED | OUTPATIENT
Start: 2019-12-19 | End: 2019-12-19 | Stop reason: HOSPADM

## 2019-12-19 RX ORDER — LORAZEPAM 2 MG/ML
0.5 INJECTION INTRAMUSCULAR
Status: DISCONTINUED | OUTPATIENT
Start: 2019-12-19 | End: 2019-12-19 | Stop reason: HOSPADM

## 2019-12-19 RX ORDER — HYDROMORPHONE HYDROCHLORIDE 1 MG/ML
0.4 INJECTION, SOLUTION INTRAMUSCULAR; INTRAVENOUS; SUBCUTANEOUS
Status: DISCONTINUED | OUTPATIENT
Start: 2019-12-19 | End: 2019-12-19 | Stop reason: HOSPADM

## 2019-12-19 RX ORDER — DOCUSATE SODIUM 100 MG/1
100 CAPSULE, LIQUID FILLED ORAL 2 TIMES DAILY
Status: DISCONTINUED | OUTPATIENT
Start: 2019-12-19 | End: 2019-12-20 | Stop reason: HOSPADM

## 2019-12-19 RX ORDER — DIPHENHYDRAMINE HCL 25 MG
25 TABLET ORAL EVERY 6 HOURS PRN
Status: DISCONTINUED | OUTPATIENT
Start: 2019-12-19 | End: 2019-12-20 | Stop reason: HOSPADM

## 2019-12-19 RX ORDER — ONDANSETRON 2 MG/ML
INJECTION INTRAMUSCULAR; INTRAVENOUS PRN
Status: DISCONTINUED | OUTPATIENT
Start: 2019-12-19 | End: 2019-12-19 | Stop reason: SURG

## 2019-12-19 RX ORDER — GABAPENTIN 300 MG/1
600 CAPSULE ORAL
Status: COMPLETED | OUTPATIENT
Start: 2019-12-19 | End: 2019-12-19

## 2019-12-19 RX ORDER — SCOLOPAMINE TRANSDERMAL SYSTEM 1 MG/1
1 PATCH, EXTENDED RELEASE TRANSDERMAL
Status: DISCONTINUED | OUTPATIENT
Start: 2019-12-19 | End: 2019-12-20 | Stop reason: HOSPADM

## 2019-12-19 RX ORDER — BUPIVACAINE HYDROCHLORIDE AND EPINEPHRINE 2.5; 5 MG/ML; UG/ML
INJECTION, SOLUTION EPIDURAL; INFILTRATION; INTRACAUDAL; PERINEURAL
Status: DISCONTINUED | OUTPATIENT
Start: 2019-12-19 | End: 2019-12-19 | Stop reason: HOSPADM

## 2019-12-19 RX ORDER — DIPHENHYDRAMINE HYDROCHLORIDE 50 MG/ML
12.5 INJECTION INTRAMUSCULAR; INTRAVENOUS
Status: DISCONTINUED | OUTPATIENT
Start: 2019-12-19 | End: 2019-12-19 | Stop reason: HOSPADM

## 2019-12-19 RX ORDER — BISACODYL 10 MG
10 SUPPOSITORY, RECTAL RECTAL
Status: DISCONTINUED | OUTPATIENT
Start: 2019-12-19 | End: 2019-12-20 | Stop reason: HOSPADM

## 2019-12-19 RX ORDER — ROCURONIUM BROMIDE 10 MG/ML
INJECTION, SOLUTION INTRAVENOUS PRN
Status: DISCONTINUED | OUTPATIENT
Start: 2019-12-19 | End: 2019-12-19 | Stop reason: SURG

## 2019-12-19 RX ORDER — CEFAZOLIN SODIUM 2 G/100ML
2 INJECTION, SOLUTION INTRAVENOUS EVERY 8 HOURS
Status: COMPLETED | OUTPATIENT
Start: 2019-12-19 | End: 2019-12-20

## 2019-12-19 RX ORDER — ACETAMINOPHEN 500 MG
500 TABLET ORAL
Status: COMPLETED | OUTPATIENT
Start: 2019-12-19 | End: 2019-12-19

## 2019-12-19 RX ORDER — TRAMADOL HYDROCHLORIDE 50 MG/1
50 TABLET ORAL EVERY 4 HOURS PRN
Status: DISCONTINUED | OUTPATIENT
Start: 2019-12-19 | End: 2019-12-20 | Stop reason: HOSPADM

## 2019-12-19 RX ORDER — HALOPERIDOL 5 MG/ML
1 INJECTION INTRAMUSCULAR
Status: DISCONTINUED | OUTPATIENT
Start: 2019-12-19 | End: 2019-12-19 | Stop reason: HOSPADM

## 2019-12-19 RX ORDER — OXYCODONE HCL 5 MG/5 ML
10 SOLUTION, ORAL ORAL
Status: COMPLETED | OUTPATIENT
Start: 2019-12-19 | End: 2019-12-19

## 2019-12-19 RX ORDER — SODIUM CHLORIDE, SODIUM LACTATE, POTASSIUM CHLORIDE, CALCIUM CHLORIDE 600; 310; 30; 20 MG/100ML; MG/100ML; MG/100ML; MG/100ML
INJECTION, SOLUTION INTRAVENOUS CONTINUOUS
Status: ACTIVE | OUTPATIENT
Start: 2019-12-19 | End: 2019-12-19

## 2019-12-19 RX ORDER — CELECOXIB 200 MG/1
200 CAPSULE ORAL
Status: COMPLETED | OUTPATIENT
Start: 2019-12-19 | End: 2019-12-19

## 2019-12-19 RX ORDER — ASPIRIN 81 MG/1
81 TABLET ORAL 2 TIMES DAILY WITH MEALS
Qty: 90 TAB | Refills: 0 | Status: SHIPPED | OUTPATIENT
Start: 2019-12-19 | End: 2022-05-22

## 2019-12-19 RX ORDER — HYDRALAZINE HYDROCHLORIDE 20 MG/ML
5 INJECTION INTRAMUSCULAR; INTRAVENOUS
Status: DISCONTINUED | OUTPATIENT
Start: 2019-12-19 | End: 2019-12-19 | Stop reason: HOSPADM

## 2019-12-19 RX ORDER — AMOXICILLIN 250 MG
1 CAPSULE ORAL
Status: DISCONTINUED | OUTPATIENT
Start: 2019-12-19 | End: 2019-12-20 | Stop reason: HOSPADM

## 2019-12-19 RX ORDER — DEXAMETHASONE SODIUM PHOSPHATE 4 MG/ML
4 INJECTION, SOLUTION INTRA-ARTICULAR; INTRALESIONAL; INTRAMUSCULAR; INTRAVENOUS; SOFT TISSUE
Status: DISCONTINUED | OUTPATIENT
Start: 2019-12-19 | End: 2019-12-20 | Stop reason: HOSPADM

## 2019-12-19 RX ORDER — KETOROLAC TROMETHAMINE 30 MG/ML
INJECTION, SOLUTION INTRAMUSCULAR; INTRAVENOUS
Status: DISCONTINUED | OUTPATIENT
Start: 2019-12-19 | End: 2019-12-19 | Stop reason: HOSPADM

## 2019-12-19 RX ORDER — HYDROCODONE BITARTRATE AND ACETAMINOPHEN 5; 325 MG/1; MG/1
1-2 TABLET ORAL EVERY 4 HOURS PRN
Qty: 42 TAB | Refills: 0 | Status: SHIPPED | OUTPATIENT
Start: 2019-12-19 | End: 2019-12-26

## 2019-12-19 RX ORDER — OXYCODONE HYDROCHLORIDE 10 MG/1
10 TABLET ORAL
Status: DISCONTINUED | OUTPATIENT
Start: 2019-12-19 | End: 2019-12-20 | Stop reason: HOSPADM

## 2019-12-19 RX ORDER — HYDROMORPHONE HYDROCHLORIDE 2 MG/ML
INJECTION, SOLUTION INTRAMUSCULAR; INTRAVENOUS; SUBCUTANEOUS PRN
Status: DISCONTINUED | OUTPATIENT
Start: 2019-12-19 | End: 2019-12-19 | Stop reason: SURG

## 2019-12-19 RX ORDER — KETOROLAC TROMETHAMINE 30 MG/ML
30 INJECTION, SOLUTION INTRAMUSCULAR; INTRAVENOUS EVERY 6 HOURS
Status: DISCONTINUED | OUTPATIENT
Start: 2019-12-19 | End: 2019-12-20 | Stop reason: HOSPADM

## 2019-12-19 RX ORDER — KETAMINE HYDROCHLORIDE 50 MG/ML
INJECTION, SOLUTION INTRAMUSCULAR; INTRAVENOUS PRN
Status: DISCONTINUED | OUTPATIENT
Start: 2019-12-19 | End: 2019-12-19 | Stop reason: SURG

## 2019-12-19 RX ORDER — DEXTROSE MONOHYDRATE, SODIUM CHLORIDE, AND POTASSIUM CHLORIDE 50; 1.49; 4.5 G/1000ML; G/1000ML; G/1000ML
INJECTION, SOLUTION INTRAVENOUS CONTINUOUS
Status: DISPENSED | OUTPATIENT
Start: 2019-12-19 | End: 2019-12-19

## 2019-12-19 RX ORDER — CEFAZOLIN SODIUM 1 G/3ML
INJECTION, POWDER, FOR SOLUTION INTRAMUSCULAR; INTRAVENOUS PRN
Status: DISCONTINUED | OUTPATIENT
Start: 2019-12-19 | End: 2019-12-19 | Stop reason: SURG

## 2019-12-19 RX ORDER — MAGNESIUM SULFATE HEPTAHYDRATE 40 MG/ML
INJECTION, SOLUTION INTRAVENOUS PRN
Status: DISCONTINUED | OUTPATIENT
Start: 2019-12-19 | End: 2019-12-19 | Stop reason: SURG

## 2019-12-19 RX ORDER — CHLORPROMAZINE HYDROCHLORIDE 10 MG/1
25 TABLET, FILM COATED ORAL EVERY 6 HOURS PRN
Status: DISCONTINUED | OUTPATIENT
Start: 2019-12-19 | End: 2019-12-20 | Stop reason: HOSPADM

## 2019-12-19 RX ORDER — DEXAMETHASONE SODIUM PHOSPHATE 4 MG/ML
INJECTION, SOLUTION INTRA-ARTICULAR; INTRALESIONAL; INTRAMUSCULAR; INTRAVENOUS; SOFT TISSUE PRN
Status: DISCONTINUED | OUTPATIENT
Start: 2019-12-19 | End: 2019-12-19 | Stop reason: SURG

## 2019-12-19 RX ORDER — MIDAZOLAM HYDROCHLORIDE 1 MG/ML
INJECTION INTRAMUSCULAR; INTRAVENOUS PRN
Status: DISCONTINUED | OUTPATIENT
Start: 2019-12-19 | End: 2019-12-19 | Stop reason: SURG

## 2019-12-19 RX ORDER — CHLORPROMAZINE HYDROCHLORIDE 25 MG/ML
25 INJECTION INTRAMUSCULAR EVERY 6 HOURS PRN
Status: DISCONTINUED | OUTPATIENT
Start: 2019-12-19 | End: 2019-12-20 | Stop reason: HOSPADM

## 2019-12-19 RX ORDER — TRANEXAMIC ACID 100 MG/ML
INJECTION, SOLUTION INTRAVENOUS PRN
Status: DISCONTINUED | OUTPATIENT
Start: 2019-12-19 | End: 2019-12-19 | Stop reason: SURG

## 2019-12-19 RX ORDER — OXYCODONE HYDROCHLORIDE 5 MG/1
5 TABLET ORAL
Status: DISCONTINUED | OUTPATIENT
Start: 2019-12-19 | End: 2019-12-20 | Stop reason: HOSPADM

## 2019-12-19 RX ORDER — LIDOCAINE HYDROCHLORIDE 10 MG/ML
INJECTION, SOLUTION EPIDURAL; INFILTRATION; INTRACAUDAL; PERINEURAL
Status: COMPLETED
Start: 2019-12-19 | End: 2019-12-19

## 2019-12-19 RX ADMIN — ROCURONIUM BROMIDE 70 MG: 10 INJECTION, SOLUTION INTRAVENOUS at 10:33

## 2019-12-19 RX ADMIN — MIDAZOLAM 2 MG: 1 INJECTION INTRAMUSCULAR; INTRAVENOUS at 10:30

## 2019-12-19 RX ADMIN — ACETAMINOPHEN 500 MG: 500 TABLET ORAL at 09:31

## 2019-12-19 RX ADMIN — LIDOCAINE HYDROCHLORIDE 80 MG: 20 INJECTION, SOLUTION EPIDURAL; INFILTRATION; INTRACAUDAL at 10:33

## 2019-12-19 RX ADMIN — FENTANYL CITRATE 25 MCG: 0.05 INJECTION, SOLUTION INTRAMUSCULAR; INTRAVENOUS at 12:43

## 2019-12-19 RX ADMIN — SUGAMMADEX 200 MG: 100 INJECTION, SOLUTION INTRAVENOUS at 11:24

## 2019-12-19 RX ADMIN — SODIUM CHLORIDE, POTASSIUM CHLORIDE, SODIUM LACTATE AND CALCIUM CHLORIDE: 600; 310; 30; 20 INJECTION, SOLUTION INTRAVENOUS at 09:31

## 2019-12-19 RX ADMIN — FENTANYL CITRATE 25 MCG: 0.05 INJECTION, SOLUTION INTRAMUSCULAR; INTRAVENOUS at 12:00

## 2019-12-19 RX ADMIN — TRANEXAMIC ACID 1000 MG: 100 INJECTION, SOLUTION INTRAVENOUS at 10:33

## 2019-12-19 RX ADMIN — EPHEDRINE SULFATE 10 MG: 50 INJECTION, SOLUTION INTRAVENOUS at 10:40

## 2019-12-19 RX ADMIN — ONDANSETRON 4 MG: 2 INJECTION INTRAMUSCULAR; INTRAVENOUS at 15:13

## 2019-12-19 RX ADMIN — GABAPENTIN 600 MG: 300 CAPSULE ORAL at 09:31

## 2019-12-19 RX ADMIN — POTASSIUM CHLORIDE, DEXTROSE MONOHYDRATE AND SODIUM CHLORIDE: 150; 5; 450 INJECTION, SOLUTION INTRAVENOUS at 15:21

## 2019-12-19 RX ADMIN — HYDROMORPHONE HYDROCHLORIDE 1 MG: 2 INJECTION, SOLUTION INTRAMUSCULAR; INTRAVENOUS; SUBCUTANEOUS at 10:33

## 2019-12-19 RX ADMIN — KETAMINE HYDROCHLORIDE 100 MG: 50 INJECTION, SOLUTION INTRAMUSCULAR; INTRAVENOUS at 10:33

## 2019-12-19 RX ADMIN — Medication 0.5 ML: at 09:31

## 2019-12-19 RX ADMIN — CEFAZOLIN SODIUM 2 G: 2 INJECTION, SOLUTION INTRAVENOUS at 17:01

## 2019-12-19 RX ADMIN — KETOROLAC TROMETHAMINE 30 MG: 30 INJECTION, SOLUTION INTRAMUSCULAR; INTRAVENOUS at 17:01

## 2019-12-19 RX ADMIN — PROPOFOL 160 MG: 10 INJECTION, EMULSION INTRAVENOUS at 10:33

## 2019-12-19 RX ADMIN — LIDOCAINE HYDROCHLORIDE 0.5 ML: 10 INJECTION, SOLUTION EPIDURAL; INFILTRATION; INTRACAUDAL at 09:31

## 2019-12-19 RX ADMIN — HYDROMORPHONE HYDROCHLORIDE 1 MG: 2 INJECTION, SOLUTION INTRAMUSCULAR; INTRAVENOUS; SUBCUTANEOUS at 10:53

## 2019-12-19 RX ADMIN — OXYCODONE HYDROCHLORIDE 10 MG: 5 SOLUTION ORAL at 12:42

## 2019-12-19 RX ADMIN — MAGNESIUM SULFATE IN WATER 4 G: 40 INJECTION, SOLUTION INTRAVENOUS at 10:55

## 2019-12-19 RX ADMIN — DOCUSATE SODIUM 100 MG: 100 CAPSULE, LIQUID FILLED ORAL at 17:01

## 2019-12-19 RX ADMIN — DEXAMETHASONE SODIUM PHOSPHATE 8 MG: 4 INJECTION, SOLUTION INTRA-ARTICULAR; INTRALESIONAL; INTRAMUSCULAR; INTRAVENOUS; SOFT TISSUE at 10:40

## 2019-12-19 RX ADMIN — CELECOXIB 200 MG: 200 CAPSULE ORAL at 09:32

## 2019-12-19 RX ADMIN — SODIUM CHLORIDE, POTASSIUM CHLORIDE, SODIUM LACTATE AND CALCIUM CHLORIDE: 600; 310; 30; 20 INJECTION, SOLUTION INTRAVENOUS at 11:24

## 2019-12-19 RX ADMIN — ONDANSETRON 4 MG: 2 INJECTION INTRAMUSCULAR; INTRAVENOUS at 11:24

## 2019-12-19 RX ADMIN — CEFAZOLIN 2 G: 330 INJECTION, POWDER, FOR SOLUTION INTRAMUSCULAR; INTRAVENOUS at 10:30

## 2019-12-19 RX ADMIN — EPHEDRINE SULFATE 10 MG: 50 INJECTION, SOLUTION INTRAVENOUS at 10:45

## 2019-12-19 SDOH — HEALTH STABILITY: MENTAL HEALTH: HOW OFTEN DO YOU HAVE 6 OR MORE DRINKS ON ONE OCCASION?: LESS THAN MONTHLY

## 2019-12-19 SDOH — HEALTH STABILITY: MENTAL HEALTH: HOW OFTEN DO YOU HAVE A DRINK CONTAINING ALCOHOL?: 2-3 TIMES A WEEK

## 2019-12-19 SDOH — HEALTH STABILITY: MENTAL HEALTH: HOW MANY STANDARD DRINKS CONTAINING ALCOHOL DO YOU HAVE ON A TYPICAL DAY?: 3 OR 4

## 2019-12-19 ASSESSMENT — LIFESTYLE VARIABLES
AVERAGE NUMBER OF DAYS PER WEEK YOU HAVE A DRINK CONTAINING ALCOHOL: 2
ALCOHOL_USE: YES
TOTAL SCORE: 3
HAVE YOU EVER FELT YOU SHOULD CUT DOWN ON YOUR DRINKING: YES
EVER HAD A DRINK FIRST THING IN THE MORNING TO STEADY YOUR NERVES TO GET RID OF A HANGOVER: NO
ON A TYPICAL DAY WHEN YOU DRINK ALCOHOL HOW MANY DRINKS DO YOU HAVE: 6
EVER_SMOKED: YES
CONSUMPTION TOTAL: POSITIVE
DOES PATIENT WANT TO STOP DRINKING: YES
HOW MANY TIMES IN THE PAST YEAR HAVE YOU HAD 5 OR MORE DRINKS IN A DAY: 52
DOES PATIENT WANT TO TALK TO SOMEONE ABOUT QUITTING: NO
TOTAL SCORE: 3
TOTAL SCORE: 3
EVER FELT BAD OR GUILTY ABOUT YOUR DRINKING: YES
HAVE PEOPLE ANNOYED YOU BY CRITICIZING YOUR DRINKING: YES

## 2019-12-19 ASSESSMENT — ACTIVITIES OF DAILY LIVING (ADL): TOILETING: INDEPENDENT

## 2019-12-19 ASSESSMENT — COGNITIVE AND FUNCTIONAL STATUS - GENERAL
STANDING UP FROM CHAIR USING ARMS: A LITTLE
SUGGESTED CMS G CODE MODIFIER DAILY ACTIVITY: CJ
TURNING FROM BACK TO SIDE WHILE IN FLAT BAD: A LITTLE
DAILY ACTIVITIY SCORE: 21
CLIMB 3 TO 5 STEPS WITH RAILING: A LOT
HELP NEEDED FOR BATHING: A LITTLE
MOVING TO AND FROM BED TO CHAIR: A LITTLE
DRESSING REGULAR LOWER BODY CLOTHING: A LITTLE
DRESSING REGULAR LOWER BODY CLOTHING: A LITTLE
MOVING FROM LYING ON BACK TO SITTING ON SIDE OF FLAT BED: A LITTLE
WALKING IN HOSPITAL ROOM: A LITTLE
TOILETING: A LITTLE
SUGGESTED CMS G CODE MODIFIER MOBILITY: CK
MOBILITY SCORE: 17
TOILETING: A LITTLE
SUGGESTED CMS G CODE MODIFIER DAILY ACTIVITY: CJ
DAILY ACTIVITIY SCORE: 20
HELP NEEDED FOR BATHING: A LOT

## 2019-12-19 ASSESSMENT — PATIENT HEALTH QUESTIONNAIRE - PHQ9
2. FEELING DOWN, DEPRESSED, IRRITABLE, OR HOPELESS: NOT AT ALL
1. LITTLE INTEREST OR PLEASURE IN DOING THINGS: NOT AT ALL
SUM OF ALL RESPONSES TO PHQ9 QUESTIONS 1 AND 2: 0

## 2019-12-19 ASSESSMENT — PAIN SCALES - GENERAL: PAIN_LEVEL: 5

## 2019-12-19 NOTE — ANESTHESIA TIME REPORT
Anesthesia Start and Stop Event Times     Date Time Event    12/19/2019 1017 Ready for Procedure     1030 Anesthesia Start     1147 Anesthesia Stop        Responsible Staff  12/19/19    Name Role Begin End    Kalin Teague M.D. Anesth 1030 1147        Preop Diagnosis (Free Text):  Pre-op Diagnosis     PAIN IN RIGHT HIP, UNILATERAL PRIMARY OSTEOARTHRITIS RIGHT HIP        Preop Diagnosis (Codes):    Post op Diagnosis  Osteoarthritis of right hip      Premium Reason  Non-Premium    Comments:

## 2019-12-19 NOTE — ANESTHESIA PREPROCEDURE EVALUATION
CPAP for PROSPER, Hep B (no active disease), prior GA with no issues. Current smoker, no COPD. Denies: MI/CHF/CVA/DM/CKD      Relevant Problems   ANESTHESIA   (+) PROSPER (obstructive sleep apnea)      CARDIAC   (+) HTN (hypertension)       Physical Exam    Airway   Mallampati: II  TM distance: >3 FB  Neck ROM: full       Cardiovascular - normal exam  Rhythm: regular  Rate: normal  (-) murmur     Dental - normal exam         Pulmonary - normal exam  Breath sounds clear to auscultation     Abdominal    Neurological - normal exam                 Anesthesia Plan    ASA 2       Plan - general       Airway plan will be ETT        Induction: intravenous    Postoperative Plan: Postoperative administration of opioids is intended.    Pertinent diagnostic labs and testing reviewed    Informed Consent:    Anesthetic plan and risks discussed with patient.    Use of blood products discussed with: patient whom consented to blood products.

## 2019-12-19 NOTE — OR NURSING
Wife notified of patient's status and waiting for transport  Patient dozing long intervals; arouses easily  Pain subsiding with medication; ice bag in place  Tolerating fluids well; no complaints of nausea  Transferred to room via bed

## 2019-12-19 NOTE — OR NURSING
Patient aroused to tactile with verbal stimuli; oral airway dc'd by Dr Teague.  Patient became very anxious and complained that he could not breathe.  Lung sounds diminished; patient reorientated and calmed; O2 sat 100% on 10 L/M oxy mask; heart rate 67-69; respiratory rate 16-26; /54.  Medicated with 25 mcg Fentanyl with relief of sensation of not being able to breathe.  1208  Patient sleeping

## 2019-12-19 NOTE — OP REPORT
DIAGNOSIS: Osteoarthritis, right hip.    PROCEDURE: right Total hip arthroplasty.    ANESTHESIA: General.    COMPLICATIONS: None.    SURGEON: Lm Hong MD.    ASSISTANT: Wendi Berrios    INDICATIONS: This is a patient with severe osteoarthritis causing pain,   having failed conservative treatments.    DESCRIPTION OF PROCEDURE: Patient was identified in the preop area, site was   marked, taken back to the operating room and underwent general anesthesia.   right lower extremity was prepped and draped in sterile manner. Preoperative   timeout was held and antibiotics were given. Incision was made coming off the  ASIS. Soft tissue dissected down to fascia. Fascia was split in line with   the tensor. Tensor was retracted laterally. Deep fascia was incised and   vessels were ligated. A capsulotomy was performed and then an osteotomy of   the femoral neck. The acetabulum was then reamed up to a 54 and a 54 R3 cluster   hole cup by Smith and Nephew was placed. A liner was placed for a 36 head.   Osteophytes were debrided and then the femur was externally rotated and   extended. This was then broached up to a size 8, and a 8 lateral offset polar stem  by Smith and Nephew was placed. Final trialing showed equal leg lengths with  a +8 head, the +8 36 Oxinium head by Smith and Nephew was placed. Wound was   soaked with dilute Betadine solution and was injected with Marcaine. Vicryl   was used for the fascia, Monocryl soft tissue skin and Dermabond for the final  skin layer. Patient was woken up, taken back to PACU, will be weightbear as   tolerated.

## 2019-12-19 NOTE — OR NURSING
Pre op admission completed.  Pt and wife educated on surgical plan of care, all questions answered.  Bed in low position and call light within reach.  Hourly rounding in place.  Notified Dr. Teague of pt's allergy to Ibuprofen but that he states he can take aleve without difficulty.  Order received for celebrex 200 mg po once in pre op.    Notified Ko OR RN of pt's latex allergy.

## 2019-12-19 NOTE — ANESTHESIA PROCEDURE NOTES
Airway  Date/Time: 12/19/2019 10:35 AM  Performed by: Kalin Teague M.D.  Authorized by: Kalin Teague M.D.     Location:  OR  Urgency:  Elective  Difficult Airway: No    Indications for Airway Management:  Anesthesia  Spontaneous Ventilation: absent    Sedation Level:  Deep  Preoxygenated: Yes    Patient Position:  Sniffing  Mask Difficulty Assessment:  1 - vent by mask  Final Airway Type:  Endotracheal airway  Final Endotracheal Airway:  ETT  Cuffed: Yes    Technique Used for Successful ETT Placement:  Direct laryngoscopy  Insertion Site:  Oral  Blade Type:  Tom  Laryngoscope Blade/Videolaryngoscope Blade Size:  4  ETT Size (mm):  7.0  Measured from:  Lips  ETT to Lips (cm):  24  Placement Verified by: auscultation and capnometry    Cormack-Lehane Classification:  Grade IIa - partial view of glottis  Number of Attempts at Approach:  1

## 2019-12-19 NOTE — THERAPY
"Occupational Therapy Evaluation completed.   Functional Status: Pt is a 63 y/o male admitted for elective R TIMA. He was pleasant and cooperative. He is at a supv level for basic self cares, functional mobility, and functional transfers with [facility] on appropriate AE/DME to maximize independence. Given demo of how to use a sock aid, pt's wife will order one and assist him until his arrives in the mail. He was most limited by nausea during the session, BP stable throughout, RN in to give nausea meds at end of session. Will remain available for follow up session if pt decides to stay overnight.  Plan of Care: Will benefit from Occupational Therapy 3 times per week  Discharge Recommendations:  Equipment: sock aid. Anticipate that the patient will have no further occupational therapy needs after discharge from the hospital.       See \"Rehab Therapy-Acute\" Patient Summary Report for complete documentation.    "

## 2019-12-19 NOTE — ANESTHESIA POSTPROCEDURE EVALUATION
Patient: Terrell White    Procedure Summary     Date:  12/19/19 Room / Location:  [place] [address] / SURGERY [facility]    Anesthesia Start:  1030 Anesthesia Stop:  1147    Procedure:  ARTHROPLASTY, HIP, TOTAL, ANTERIOR APPROACH (Right Hip) Diagnosis:  (PAIN IN RIGHT HIP, UNILATERAL PRIMARY OSTEOARTHRITIS RIGHT HIP)    Surgeon:  Lm Hong M.D. Responsible Provider:  Kalin Teague M.D.    Anesthesia Type:  general ASA Status:  2          Final Anesthesia Type: general  Last vitals  BP   Blood Pressure: 120/63    Temp   36.4 °C (97.5 °F)    Pulse   Pulse: 67   Resp   20    SpO2   100 %      Anesthesia Post Evaluation    Patient location during evaluation: PACU  Patient participation: complete - patient participated  Level of consciousness: awake and alert  Pain score: 5    Airway patency: patent  Anesthetic complications: no  Cardiovascular status: hemodynamically stable  Respiratory status: acceptable  Hydration status: euvolemic    PONV: none

## 2019-12-20 VITALS
OXYGEN SATURATION: 100 % | TEMPERATURE: 98.2 F | SYSTOLIC BLOOD PRESSURE: 117 MMHG | WEIGHT: 214.51 LBS | HEIGHT: 69 IN | DIASTOLIC BLOOD PRESSURE: 66 MMHG | RESPIRATION RATE: 16 BRPM | BODY MASS INDEX: 31.77 KG/M2 | HEART RATE: 63 BPM

## 2019-12-20 LAB
HCT VFR BLD AUTO: 34.7 % (ref 42–52)
HGB BLD-MCNC: 11.8 G/DL (ref 14–18)

## 2019-12-20 PROCEDURE — 97161 PT EVAL LOW COMPLEX 20 MIN: CPT

## 2019-12-20 PROCEDURE — 85018 HEMOGLOBIN: CPT

## 2019-12-20 PROCEDURE — A9270 NON-COVERED ITEM OR SERVICE: HCPCS | Performed by: PHYSICIAN ASSISTANT

## 2019-12-20 PROCEDURE — 700112 HCHG RX REV CODE 229: Performed by: PHYSICIAN ASSISTANT

## 2019-12-20 PROCEDURE — 36415 COLL VENOUS BLD VENIPUNCTURE: CPT

## 2019-12-20 PROCEDURE — 700102 HCHG RX REV CODE 250 W/ 637 OVERRIDE(OP): Performed by: PHYSICIAN ASSISTANT

## 2019-12-20 PROCEDURE — 700111 HCHG RX REV CODE 636 W/ 250 OVERRIDE (IP): Performed by: PHYSICIAN ASSISTANT

## 2019-12-20 PROCEDURE — 85014 HEMATOCRIT: CPT

## 2019-12-20 RX ADMIN — KETOROLAC TROMETHAMINE 30 MG: 30 INJECTION, SOLUTION INTRAMUSCULAR; INTRAVENOUS at 05:16

## 2019-12-20 RX ADMIN — DOCUSATE SODIUM 100 MG: 100 CAPSULE, LIQUID FILLED ORAL at 05:16

## 2019-12-20 RX ADMIN — KETOROLAC TROMETHAMINE 30 MG: 30 INJECTION, SOLUTION INTRAMUSCULAR; INTRAVENOUS at 00:01

## 2019-12-20 RX ADMIN — CEFAZOLIN SODIUM 2 G: 2 INJECTION, SOLUTION INTRAVENOUS at 00:01

## 2019-12-20 RX ADMIN — ASPIRIN 81 MG: 81 TABLET, COATED ORAL at 00:00

## 2019-12-20 ASSESSMENT — GAIT ASSESSMENTS
GAIT LEVEL OF ASSIST: SUPERVISED
ASSISTIVE DEVICE: FRONT WHEEL WALKER
DISTANCE (FEET): 200

## 2019-12-20 ASSESSMENT — COGNITIVE AND FUNCTIONAL STATUS - GENERAL
SUGGESTED CMS G CODE MODIFIER MOBILITY: CI
MOBILITY SCORE: 23
CLIMB 3 TO 5 STEPS WITH RAILING: A LITTLE

## 2019-12-20 NOTE — THERAPY
"Physical Therapy Evaluation completed.   Bed Mobility:  Supine to Sit: Supervised  Transfers: Sit to Stand: Supervised  Gait: Level Of Assist: Supervised with Front-Wheel Walker       Plan of Care: Patient with no further skilled PT needs in the acute care setting at this time  Discharge Recommendations: Equipment: No Equipment Needed. Post-acute therapy Currently anticipate no further skilled therapy needs once patient is discharged from the inpatient setting.    Pt is s/p R TIMA and presents without gross mobility impairment. He performed bed mobility, transfers, and x200' of gait with FWW and no LOB. At this time, pt does not require further acute PT intervention and appears functionally capable of return home.     See \"Rehab Therapy-Acute\" Patient Summary Report for complete documentation.     "

## 2019-12-20 NOTE — PROGRESS NOTES
Pt. Is given discharge information, educated about medications, educated about following up with upcoming appointments, pt. IV d/c, pt. Has no further questions needed. Pt. Wife at bedside understanding discharge instructions

## 2019-12-20 NOTE — DISCHARGE SUMMARY
Patient was admitted for a Total Hip Arthroplasty.  Had no complications during the surgery. Did well post-operatively.     Recent Labs     12/17/19  1601   SODIUM 141   POTASSIUM 3.9   CHLORIDE 104   CO2 25   GLUCOSE 108*   BUN 11   CREATININE 0.72   CALCIUM 9.7     Recent Labs     12/17/19  1601 12/20/19  0613   WBC 5.7  --    RBC 4.74  --    HEMOGLOBIN 15.3 11.8*   HEMATOCRIT 44.6 34.7*   MCV 94.1  --    MCH 32.3  --    MCHC 34.3  --    RDW 41.6  --    PLATELETCT 202  --    MPV 10.4  --        There are no active hospital problems to display for this patient.      Uneventful hospital course.     Medication List      START taking these medications      Instructions   aspirin 81 MG EC tablet   Take 1 Tab by mouth 2 times a day, with meals.  Dose:  81 mg     HYDROcodone-acetaminophen 5-325 MG Tabs per tablet  Commonly known as:  NORCO   Take 1-2 Tabs by mouth every four hours as needed for up to 7 days.  Dose:  1-2 Tab        CONTINUE taking these medications      Instructions   ALEVE 220 MG Caps  Generic drug:  Naproxen Sodium   Take 2 Caps by mouth every day.  Dose:  2 Cap            Patient will be discharged home and follow up with Dr. Hong clinic in 2 weeks, for which the patient already has scheduled.

## 2019-12-20 NOTE — CARE PLAN
Problem: Discharge Barriers/Planning  Goal: Patient's continuum of care needs will be met  Outcome: PROGRESSING AS EXPECTED  Pt updated on POC, just needs to be evaluated by PT, most likely discharge today, pt ambulated 400 ft+, multiple voids to bathroom, pt denies further questions or concerns at this time.       Problem: Pain Management  Goal: Pain level will decrease to patient's comfort goal  Outcome: PROGRESSING AS EXPECTED  Pt reports 0 pain at this time, educated to use call light if feeling pain increase, importance of keeping pain controlled before getting to intolerable levels, pt verbalized understanding, denies further needs at this time.

## 2019-12-20 NOTE — PROGRESS NOTES
2 RN skin check completed with HERLINDA Young  Devices in place SCD, Oxymask.  Skin assessed under devices :yes.  Confirmed pressure ulcers found on :N/A.  New potential pressure ulcers noted on N/A.   Pt. Sacrum pink and blanching, pt. Repositioning by self in bed, pt. Using moisturizer  Pt incision site assessed

## 2019-12-20 NOTE — CARE PLAN
Problem: Communication  Goal: The ability to communicate needs accurately and effectively will improve  Outcome: PROGRESSING AS EXPECTED  Note:   Spoken to in low tone voice, face to face     Problem: Discharge Barriers/Planning  Goal: Patient's continuum of care needs will be met  Outcome: PROGRESSING AS EXPECTED  Note:   Pt. Educated about follow up appointments, medications, post surgical incision instructions

## 2019-12-20 NOTE — CARE PLAN
Problem: Safety  Goal: Will remain free from falls  Outcome: PROGRESSING AS EXPECTED  Note:   Bed in lowest position, locked, upper side rails up, treaded socks in place, educated about using the call light, bedside table within reach, using FWW to mobilizer     Problem: Infection  Goal: Will remain free from infection  Outcome: PROGRESSING AS EXPECTED  Note:   Pt incision site assessed, afebrile, educated about handwashing and signs of infection

## 2019-12-20 NOTE — DISCHARGE INSTRUCTIONS
Discharge Instructions    Discharged to home by car with friend. Discharged via wheelchair, hospital escort: Yes.  Special equipment needed: Not Applicable    Be sure to schedule a follow-up appointment with your primary care doctor or any specialists as instructed.     Discharge Plan:   Diet Plan: Discussed  Activity Level: Discussed  Confirmed Follow up Appointment: Appointment Scheduled  Confirmed Symptoms Management: Discussed  Medication Reconciliation Updated: Yes  Influenza Vaccine Indication: Patient Refuses    I understand that a diet low in cholesterol, fat, and sodium is recommended for good health. Unless I have been given specific instructions below for another diet, I accept this instruction as my diet prescription.   Other diet: regular    Special Instructions: Discharge instructions for the Orthopedic Patient    Follow up with Primary Care Physician within 2 weeks of discharge to home, regarding:  Review of medications and diagnostic testing.  Surveillance for medical complications.  Workup and treatment of osteoporosis, if appropriate.     -Is this a Joint Replacement patient? Yes   Total Joint Hip Replacement Discharge Instructions    Pain  - The goal is to slowly wean off the prescription pain medicine.  - Ice can be used for pain control.  20 minutes at a time is recommended, and never directly against your skin or incision.  - Most patients are off the pain pills by 3 weeks; others may require a low level of pain medications for many months. If your pain continues to be severe, follow up with your physician.  Infection  Deep hip joint infections that require removal of the prostheses occur in less than 0.1% of patients. Lesser infections in the skin (cellulites) are more common and much more easily treated.  - Keep the incision as clean and dry as possible.  - Always wash your hands before touching your incision.  - Skin infections tend to develop around 7-10 days after surgery, most can be  treated with oral antibiotics.  - Dental Care should be delayed for 3 months after surgery, your surgeon recommends taking a dose of antibiotics 1 hour prior to any dental procedure.  After 2 years, most surgeons recommend antibiotics only before an extensive procedure.  Ask your surgeon what he recommends.  - Signs and symptoms of infection can include:  low grade fever, redness, pain, swelling and drainage from your incision.  Notify your surgeon immediately if you develop any of these symptoms.  Post op Disturbances  - Bowel habits - constipation is extremely common and is caused by a combination of anesthesia, lack of mobility and pain medicine.  Use stool softeners or laxatives if necessary. It is important not to ignore this problem, as bowel obstructions can be a serious complication after joint replacement surgery.  - Mood/Energy Level - Many patients experience a lack of energy and endurance for up to 2-3 months after surgery.  Some may also feel down and can even become depressed.  This is likely due to the postoperative anemia, change in activity level, lack of sleep, pain medicine and just the emotional reaction to the surgery itself that is a big disruption in a person’s life.  This usually passes.  If symptoms persist, follow up with your primary physician.  - Returning to work - Your surgeon will give you more specific instructions.  Generally, if you work a sedentary job requiring little standing or walking, most patients may return within 2-6 weeks.  Manual labor jobs involving walking, lifting and standing may take 3-4 months.  Your surgeon’s office can provide a release to part-time or light duty work early on in your recovery and progress you to full duty as able.  - Driving - You can begin driving an automatic shift car in 4 to 8 weeks, provided you are no longer taking narcotic pain medication. If you have a stick-shift car and your right hip was replaced, do not begin driving until your doctor  says you can.   - Avoiding falls -  throw rugs and tack down loose carpeting.  Be aware of floor hazards such as pets, small objects or uneven surfaces.   -  Airport Metal Detectors - The sensitivity of metal detectors varies and it is likely that your prosthesis will cause an alarm. Inform the  that you have an artificial joint.  Diet  - Resume your normal diet as tolerated.  - It is important to achieve a healthy nutritional status by eating a well balanced diet on a regular basis.  - Your physician may recommend that you take iron and vitamin supplements.   - Continue to drink plenty of fluids.  Shower/Bathing  - You may shower as soon as you get home from the hospital unless otherwise instructed.  - Keep your incision out of water.  To keep the incision dry when showering, cover it with a plastic bag or plastic wrap.  - Pat incision dry if it gets wet.  Don’t rub.  - Do not submerge in a bath until staples are out and the incision is completely healed. (Approximately 6-8 weeks after surgery).  Dressing Change:  Procedure (if recommended by your physician)  - Wash hands.  - Open all dressing change materials.  - Remove old dressing and discard.  - Inspect incision for redness, increase in clear drainage, yellow/green drainage, odor and surrounding skin hot to touch.  -  ABD (large gauze) pad by one corner and lay over the incision.  Be careful not to touch the inside of the dressing that will lay over the incision.  - Secure in place as instructed (Ace wrap or tape).    Swelling/Bruising  - Swelling is normal after hip replacement and can involve the thigh, knee, calf and foot.  - Swelling can last from 3-6 months.  - Elevate your leg higher than your heart while reclining.  The first week you are home you should elevate your leg an equal amount of time, as you are active.    - Anti-inflammatory pills can be taken once you have stopped the blood thinners.  - The swelling is usually  worse after you go home since you are upright for longer periods of time.  - Bruising is common and can involve the entire leg including the thigh, calf and even foot.  Bruising often does not appear until after you arrive home and it can be quite dramatic- purple, black, green.  The bruising you can see is not usually concerning and will subside without any treatment.      Blood Clot Prevention  Blood clots in the legs and the less common, but frightening, clots that travel to the lungs are a real focus of our preventative. Most patients are at standard risk for them, but those patients who are at higher risk include people who have had previous clots, a family history of clotting, smoking, diabetes, obesity, advanced age, use of estrogen and a sedentary lifestyle.    - Signs of blood clots in legs - Swelling in thigh, calf or ankle that does not go down with elevation.  Pain, heat and tenderness in calf, back of calf or groin area.  NOTE: blood clots can occur in either leg.  - You have been receiving anticoagulant therapy (blood thinners) in the hospital and you may be instructed to continue at home depending on your risk factors.  - Your risk for developing a clot continues for up to 2-3 months after surgery.  You should avoid prolonged sitting and dehydration during that time (long air trips and car trips).  If you do take a trip during this time, please get up and move around every 1- 1.5 hours.  - If you are prescribed blood thinning medication for home, follow instructions as directed. (Handouts provided if applicable).      Activity    Once you get home, you should stay active. The key is not to overdo it! While you can expect some good days and some bad days, you should notice a gradual improvement over time you should notice a gradual improvement and a gradual increase in your endurance over the next 6 to 12 months.    - Weight Bearing - If you have undergone cemented or hybrid hip replacement, you can  put some weight on the leg immediately using a cane or walker, and you should continue to use some support for 4 to 6 weeks to help the muscles recover.   - Sleeping Positions - Sleep on your back with your legs slightly apart or on your side with a regular pillow between your knees. Be sure to use the pillow for at least 6 weeks, or until your doctor says you can do without it. Sleeping on your stomach should be all right  - Sitting - For at least the first 3 months, sit only in chairs that have arms. Do not sit on low chairs, low stools, or reclining chairs. Do not cross your legs at the knees. The physical therapist will show you how to sit and stand from a chair, keeping your affected leg out in front of you. Get up and move around on a regular basis--at least once every hour.  - Walking - Walk as much as you like once your doctor gives you the go-ahead, but remember that walking is no substitute for your prescribed exercises. Walking with a pair of trekking poles is helpful and adds as much as 40% to the exercise you get when you walk  - Therapy may be needed in some cases, to strengthen your muscles and improve your gait (walking pattern).  This decision will be made at your post-operative appointment.  Follow your therapist recommended post-operative exercises (handout provided by Therapist).  - Swimming is also recommended; you can begin as soon as the sutures have been removed and the wound is healed, approximately 6 to 8 weeks after surgery. Using a pair of training fins may make swimming a more enjoyable and effective exercise.  - Other activities - Lower impact activities are preferred.  If you have specific questions, consult your Surgeon.    - Sexual activity - Your surgeon can tell you when it should be safe to resume sexual activity.      When to Call the Doctor   Call the physician if:   - Fever over 100.5? F  - Increased pain, drainage, redness, odor or heat around the incision area  - Shaking  chills  - Increased knee pain with activity and rest  - Increased pain in calf, tenderness or redness above or below the knee  - Increased swelling of calf, ankle, foot  - Sudden increased shortness of breath, sudden onset of chest pain, localized chest pain with coughing  - Incision opening  Or, if there are any questions or concerns about medications or care.       -Is this patient being discharged with medication to prevent blood clots?  Yes, Aspirin Aspirin, ASA oral tablets  What is this medicine?  ASPIRIN (AS pir in) is a pain reliever. It is used to treat mild pain and fever. This medicine is also used as directed by a doctor to prevent and to treat heart attacks, to prevent strokes, and to treat arthritis or inflammation.  This medicine may be used for other purposes; ask your health care provider or pharmacist if you have questions.  COMMON BRAND NAME(S): Aspir-Low, Aspir-Dorene, Aspirtab, Elena Advanced Aspirin, Elena Aspirin, Elena Aspirin Extra Strength, Elena Aspirin Plus, Elena Extra Strength, Elena Extra Strength Plus, Elena Genuine Aspirin, Elena Womens Aspirin, Bufferin, Bufferin Extra Strength, Bufferin Low Dose  What should I tell my health care provider before I take this medicine?  They need to know if you have any of these conditions:  -anemia  -asthma  -bleeding problems  -child with chickenpox, the flu, or other viral infection  -diabetes  -gout  -if you frequently drink alcohol containing drinks  -kidney disease  -liver disease  -low level of vitamin K  -lupus  -smoke tobacco  -stomach ulcers or other problems  -an unusual or allergic reaction to aspirin, tartrazine dye, other medicines, dyes, or preservatives  -pregnant or trying to get pregnant  -breast-feeding  How should I use this medicine?  Take this medicine by mouth with a glass of water. Follow the directions on the package or prescription label. You can take this medicine with or without food. If it upsets your stomach, take it with  food. Do not take your medicine more often than directed.  Talk to your pediatrician regarding the use of this medicine in children. While this drug may be prescribed for children as young as 12 years of age for selected conditions, precautions do apply. Children and teenagers should not use this medicine to treat chicken pox or flu symptoms unless directed by a doctor.  Patients over 65 years old may have a stronger reaction and need a smaller dose.  Overdosage: If you think you have taken too much of this medicine contact a poison control center or emergency room at once.  NOTE: This medicine is only for you. Do not share this medicine with others.  What if I miss a dose?  If you are taking this medicine on a regular schedule and miss a dose, take it as soon as you can. If it is almost time for your next dose, take only that dose. Do not take double or extra doses.  What may interact with this medicine?  Do not take this medicine with any of the following medications:  -cidofovir  -ketorolac  -probenecid  This medicine may also interact with the following medications:  -alcohol  -alendronate  -bismuth subsalicylate  -flavocoxid  -herbal supplements like feverfew, garlic, fransisco, ginkgo biloba, horse chestnut  -medicines for diabetes or glaucoma like acetazolamide, methazolamide  -medicines for gout  -medicines that treat or prevent blood clots like enoxaparin, heparin, ticlopidine, warfarin  -other aspirin and aspirin-like medicines  -NSAIDs, medicines for pain and inflammation, like ibuprofen or naproxen  -pemetrexed  -sulfinpyrazone  -varicella live vaccine  This list may not describe all possible interactions. Give your health care provider a list of all the medicines, herbs, non-prescription drugs, or dietary supplements you use. Also tell them if you smoke, drink alcohol, or use illegal drugs. Some items may interact with your medicine.  What should I watch for while using this medicine?  If you are treating  yourself for pain, tell your doctor or health care professional if the pain lasts more than 10 days, if it gets worse, or if there is a new or different kind of pain. Tell your doctor if you see redness or swelling. Also, check with your doctor if you have a fever that lasts for more than 3 days. Only take this medicine to prevent heart attacks or blood clotting if prescribed by your doctor or health care professional.  Do not take aspirin or aspirin-like medicines with this medicine. Too much aspirin can be dangerous. Always read the labels carefully.  This medicine can irritate your stomach or cause bleeding problems. Do not smoke cigarettes or drink alcohol while taking this medicine. Do not lie down for 30 minutes after taking this medicine to prevent irritation to your throat.  If you are scheduled for any medical or dental procedure, tell your healthcare provider that you are taking this medicine. You may need to stop taking this medicine before the procedure.  This medicine may be used to treat migraines. If you take migraine medicines for 10 or more days a month, your migraines may get worse. Keep a diary of headache days and medicine use. Contact your healthcare professional if your migraine attacks occur more frequently.  What side effects may I notice from receiving this medicine?  Side effects that you should report to your doctor or health care professional as soon as possible:  -allergic reactions like skin rash, itching or hives, swelling of the face, lips, or tongue  -breathing problems  -changes in hearing, ringing in the ears  -confusion  -general ill feeling or flu-like symptoms  -pain on swallowing  -redness, blistering, peeling or loosening of the skin, including inside the mouth or nose  -signs and symptoms of bleeding such as bloody or black, tarry stools; red or dark-brown urine; spitting up blood or brown material that looks like coffee grounds; red spots on the skin; unusual bruising or  bleeding from the eye, gums, or nose  -trouble passing urine or change in the amount of urine  -unusually weak or tired  -yellowing of the eyes or skin  Side effects that usually do not require medical attention (report to your doctor or health care professional if they continue or are bothersome):  -diarrhea or constipation  -headache  -nausea, vomiting  -stomach gas, heartburn  This list may not describe all possible side effects. Call your doctor for medical advice about side effects. You may report side effects to FDA at 8-326-QMH-0331.  Where should I keep my medicine?  Keep out of the reach of children.  Store at room temperature between 15 and 30 degrees C (59 and 86 degrees F). Protect from heat and moisture. Do not use this medicine if it has a strong vinegar smell. Throw away any unused medicine after the expiration date.  NOTE: This sheet is a summary. It may not cover all possible information. If you have questions about this medicine, talk to your doctor, pharmacist, or health care provider.  © 2018 [employer]/Gold Standard (2014-08-19 11:30:31)      · Is patient discharged on Warfarin / Coumadin?   No     Depression / Suicide Risk    As [facility] are [facility], it is important to learn how to keep safe from harming yourself.    Recognize the warning signs:  · Abrupt changes in personality, positive or negative- including increase in energy   · Giving away possessions  · Change in eating patterns- significant weight changes-  positive or negative  · Change in sleeping patterns- unable to sleep or sleeping all the time   · Unwillingness or inability to communicate  · Depression  · Unusual sadness, discouragement and loneliness  · Talk of wanting to die  · Neglect of personal appearance   · Rebelliousness- reckless behavior  · Withdrawal from people/activities they love  · Confusion- inability to concentrate     If you or a loved one observes any of these behaviors or has  concerns about self-harm, here's what you can do:  · Talk about it- your feelings and reasons for harming yourself  · Remove any means that you might use to hurt yourself (examples: pills, rope, extension cords, firearm)  · Get professional help from the community (Mental Health, Substance Abuse, psychological counseling)  · Do not be alone:Call your Safe Contact- someone whom you trust who will be there for you.  · Call your local CRISIS HOTLINE 123-0145 or 754-108-8669  · Call your local Children's Mobile Crisis Response Team [place] (331) 027-3627 or www.[employer]  · Call the toll free National Suicide Prevention Hotlines   · National Suicide Prevention Lifeline 878-826-VWSJ (3160)  · [employer] [facility] Line Network 800-SUICIDE (162-3884)      Total Hip Replacement, Care After  These instructions give you information on caring for yourself after your procedure. Your doctor also may give you specific instructions. Call your doctor if you have any problems or questions after your procedure.  Follow these instructions at home:  Your doctor will give you instructions on what types of movements are okay and not okay.  · Take medicines as told by your doctor.  · Do not take baths, swim, or use a hot tub until your doctor says that it is okay.  · Avoid lifting until your doctor says it is okay.  · Use a raised toilet as told by your doctor.  · Avoid sitting in low chairs as told by your doctor.  · Use crutches or a walker as told by your doctor.  · Rest often, but move around as much as you can. Movement helps you to heal and helps to prevent problems.  · Wear special socks (compression stockings) as told by your doctor. These socks help to prevent blood clots and lessen swelling of your legs.  · Follow instructions from your doctor about how to take care of your cut from surgery (incision). Make sure you:  ¨ Wash your hands with soap and water before you change your bandage (dressing). If you cannot use soap  and water, use hand .  ¨ Change your bandage as told by your doctor.  ¨ Leave stitches (sutures), skin glue, or skin tape (adhesive) strips in place. These may need to stay in place for 2 weeks or longer. If the tape strips get loose and curl up, you may trim the loose edges. Do not remove the strips completely unless your doctor says it is okay.  Contact a doctor if:  · You have trouble breathing.  · You have fluid coming from your surgery site.  · You have redness or swelling at your surgery site.  · You have a bad smell coming from your surgery site.  · You have bleeding that will not stop.  · Your surgical cut opens up after sutures (stitches) or staples are removed.  · You have a fever.  Get help right away if:  · You have a rash.  · You have pain or puffiness on your thigh or on the back of your lower leg.  · You have shortness of breath or chest pain.  This information is not intended to replace advice given to you by your health care provider. Make sure you discuss any questions you have with your health care provider.  Document Released: 03/11/2013 Document Revised: 08/21/2017 Document Reviewed: 02/18/2015  [employer] Interactive Patient Education © 2017 [employer]  *      Follow up with Dr. Hong at scheduled appointment  *Weight bearing as tolerated                     *Activity as tolerated  *Use assistive device for all activity  *Continue exercises provided by physical therapy  *Elevate leg as needed  *Ice as needed (20 minutes every 1-2 hours)  *Keep dressing in place until 12/24/2019 postoperative day #5   *Starting 12/24/2019 remove dressing and shower. Do not soak or scrub incision, after shower pat dry and leave open to air.  *No soaking of the incision; no baths, hot tubs, or swimming until cleared by doctor  * 81 mg twice a day for blood clot prevention        *Take medications as prescribed by doctor  *Call doctor’s office with any questions or concerns

## 2020-03-01 ENCOUNTER — APPOINTMENT (OUTPATIENT)
Dept: RADIOLOGY | Facility: MEDICAL CENTER | Age: 65
End: 2020-03-01
Attending: EMERGENCY MEDICINE
Payer: COMMERCIAL

## 2020-03-01 ENCOUNTER — HOSPITAL ENCOUNTER (EMERGENCY)
Facility: MEDICAL CENTER | Age: 65
End: 2020-03-01
Attending: EMERGENCY MEDICINE
Payer: COMMERCIAL

## 2020-03-01 VITALS
TEMPERATURE: 98.1 F | HEIGHT: 69 IN | WEIGHT: 226.63 LBS | DIASTOLIC BLOOD PRESSURE: 74 MMHG | RESPIRATION RATE: 18 BRPM | BODY MASS INDEX: 33.57 KG/M2 | SYSTOLIC BLOOD PRESSURE: 136 MMHG | HEART RATE: 75 BPM | OXYGEN SATURATION: 96 %

## 2020-03-01 DIAGNOSIS — M60.852 MYOSITIS OF LEFT THIGH, UNSPECIFIED MYOSITIS TYPE: ICD-10-CM

## 2020-03-01 DIAGNOSIS — M79.652 PAIN OF LEFT THIGH: ICD-10-CM

## 2020-03-01 LAB
ANION GAP SERPL CALC-SCNC: 12 MMOL/L (ref 7–16)
BASOPHILS # BLD AUTO: 1.2 % (ref 0–1.8)
BASOPHILS # BLD: 0.07 K/UL (ref 0–0.12)
BUN SERPL-MCNC: 19 MG/DL (ref 8–22)
CALCIUM SERPL-MCNC: 8.8 MG/DL (ref 8.4–10.2)
CHLORIDE SERPL-SCNC: 107 MMOL/L (ref 96–112)
CK SERPL-CCNC: 548 U/L (ref 0–154)
CO2 SERPL-SCNC: 21 MMOL/L (ref 20–33)
CREAT SERPL-MCNC: 0.72 MG/DL (ref 0.5–1.4)
EOSINOPHIL # BLD AUTO: 0.41 K/UL (ref 0–0.51)
EOSINOPHIL NFR BLD: 6.9 % (ref 0–6.9)
ERYTHROCYTE [DISTWIDTH] IN BLOOD BY AUTOMATED COUNT: 42.7 FL (ref 35.9–50)
GLUCOSE SERPL-MCNC: 114 MG/DL (ref 65–99)
HCT VFR BLD AUTO: 41.2 % (ref 42–52)
HGB BLD-MCNC: 13.9 G/DL (ref 14–18)
IMM GRANULOCYTES # BLD AUTO: 0.02 K/UL (ref 0–0.11)
IMM GRANULOCYTES NFR BLD AUTO: 0.3 % (ref 0–0.9)
LYMPHOCYTES # BLD AUTO: 2.2 K/UL (ref 1–4.8)
LYMPHOCYTES NFR BLD: 37 % (ref 22–41)
MCH RBC QN AUTO: 30.2 PG (ref 27–33)
MCHC RBC AUTO-ENTMCNC: 33.7 G/DL (ref 33.7–35.3)
MCV RBC AUTO: 89.4 FL (ref 81.4–97.8)
MONOCYTES # BLD AUTO: 0.54 K/UL (ref 0–0.85)
MONOCYTES NFR BLD AUTO: 9.1 % (ref 0–13.4)
NEUTROPHILS # BLD AUTO: 2.7 K/UL (ref 1.82–7.42)
NEUTROPHILS NFR BLD: 45.5 % (ref 44–72)
NRBC # BLD AUTO: 0 K/UL
NRBC BLD-RTO: 0 /100 WBC
PLATELET # BLD AUTO: 181 K/UL (ref 164–446)
PMV BLD AUTO: 11.1 FL (ref 9–12.9)
POTASSIUM SERPL-SCNC: 3.9 MMOL/L (ref 3.6–5.5)
RBC # BLD AUTO: 4.61 M/UL (ref 4.7–6.1)
SODIUM SERPL-SCNC: 140 MMOL/L (ref 135–145)
WBC # BLD AUTO: 5.9 K/UL (ref 4.8–10.8)

## 2020-03-01 PROCEDURE — 700102 HCHG RX REV CODE 250 W/ 637 OVERRIDE(OP): Performed by: EMERGENCY MEDICINE

## 2020-03-01 PROCEDURE — 73564 X-RAY EXAM KNEE 4 OR MORE: CPT | Mod: LT

## 2020-03-01 PROCEDURE — 82550 ASSAY OF CK (CPK): CPT

## 2020-03-01 PROCEDURE — 80048 BASIC METABOLIC PNL TOTAL CA: CPT

## 2020-03-01 PROCEDURE — 73502 X-RAY EXAM HIP UNI 2-3 VIEWS: CPT | Mod: LT

## 2020-03-01 PROCEDURE — 85025 COMPLETE CBC W/AUTO DIFF WBC: CPT

## 2020-03-01 PROCEDURE — A9270 NON-COVERED ITEM OR SERVICE: HCPCS | Performed by: EMERGENCY MEDICINE

## 2020-03-01 PROCEDURE — 99284 EMERGENCY DEPT VISIT MOD MDM: CPT

## 2020-03-01 RX ORDER — HYDROCODONE BITARTRATE AND ACETAMINOPHEN 5; 325 MG/1; MG/1
1 TABLET ORAL ONCE
Status: COMPLETED | OUTPATIENT
Start: 2020-03-01 | End: 2020-03-01

## 2020-03-01 RX ORDER — OXYCODONE HYDROCHLORIDE 5 MG/1
5 TABLET ORAL EVERY 6 HOURS PRN
Qty: 6 TAB | Refills: 0 | Status: SHIPPED | OUTPATIENT
Start: 2020-03-01 | End: 2020-03-04

## 2020-03-01 RX ADMIN — HYDROCODONE BITARTRATE AND ACETAMINOPHEN 1 TABLET: 5; 325 TABLET ORAL at 03:12

## 2020-03-01 ASSESSMENT — FIBROSIS 4 INDEX: FIB4 SCORE: 1.35

## 2020-03-01 NOTE — DISCHARGE INSTRUCTIONS
You were seen in the emergency department for pain in your leg.  Your x-rays and labs were reassuring.  Your labs and exam are suggestive of inflammation of your thigh.  The cause of this is not clear, however the treatment from all causes the same.  Please drink plenty of fluids.  Please listen to your body and only use your leg as long as it does not cause you significant pain.    For pain you can take ibuprofen (Motrin), 600mg every 6 hours as needed for pain (take with food to avoid GI upset). You can also take acetaminophen (Tylenol), 1000mg every 8 hours as needed for pain. Do not take more than 3000mg of acetaminophen in any 24 hour period.  Taking these medications regularly during the day can be very effective in controlling pain.    You are being sent home with an opioid pain medication. This medication causes sedation and you should not drive or operate machinery while taking it. You should not use alcohol or recreational drugs while taking this medication. Side effects of this medication can include itching, nausea, and constipation.    There is a risk of addiction to this medication and you should only take the smallest amount necessary to control your symptoms. You should use other non-opioid pain medications for your baseline pain and only use this medication if necessary.     There are many alternatives to pain medications, such as massage, acupuncture, and meditation. Check with your health insurance regarding their coverage of these complementary treatments.      Please follow-up with your regular doctor.    Please return to the emergency department or seek medical attention if you develop:  Fevers, inability to move any of your joints, loss of feeling in any part of your body, loss of control of your bowel or bladder, any other new or concerning findings

## 2020-03-01 NOTE — ED TRIAGE NOTES
Patient c/o L hip pain for 3 days after a particularly hard day at work. Patient is 2 month s/p R hip surgery. Patient is able to ambulate with difficulty.

## 2020-03-01 NOTE — ED PROVIDER NOTES
ED Provider Note      Means of Arrival: Private vehicle  History obtained from: Patient      CHIEF COMPLAINT  Chief Complaint   Patient presents with   • Hip Pain     L       HPI  Terrell White is a 64 y.o. male who presents with 3 days of left hip pain.  He reports the pain is from his left hip through his anterior thigh into his knee.  Is worse with activity.  He denies any fevers or falls.  He reports it onset after an increase in walking at work.  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  He denies any paresthesias, saddle anesthesia, urinary retention.    REVIEW OF SYSTEMS  CONSTITUTIONAL:  No fever.  CARDIOVASCULAR:  No chest discomfort.  RESPIRATORY:  No pleuritic chest pain.  GASTROINTESTINAL:  No abdominal pain.  GENITOURINARY:   No dysuria.  MUSCULOSKELETAL: See HPI  SKIN:  No rash or suspicious lesions.  NEUROLOGIC:   No headache.  ENDOCRINE:  No facial edema.  HEMATOLOGIC:  No abnormal bleeding.       See HPI for further details.   All other systems are negative.     PAST MEDICAL HISTORY  Past Medical History:   Diagnosis Date   • Arthritis 12/17/2019    osteo   • Back pain    • Cataract     IOL   • Chickenpox    • Heart burn 12/17/2019   • Hepatitis B    • Influenza    • Sleep apnea 12/17/2019    + cpap   • Snoring    • TMJ (dislocation of temporomandibular joint)    • Tonsillitis        FAMILY HISTORY  Family History   Problem Relation Age of Onset   • No Known Problems Mother    • Diabetes Father    • No Known Problems Sister    • No Known Problems Brother    • No Known Problems Sister    • No Known Problems Sister    • No Known Problems Brother    • No Known Problems Brother    • No Known Problems Brother    • No Known Problems Brother    • No Known Problems Daughter    • No Known Problems Son    • No Known Problems Daughter    • Sleep Apnea Neg Hx        SOCIAL HISTORY   reports that he has been smoking cigarettes. He has a 5.25  "pack-year smoking history. He has never used smokeless tobacco. He reports current alcohol use. He reports that he does not use drugs.    SURGICAL HISTORY  Past Surgical History:   Procedure Laterality Date   • PB TOTAL HIP ARTHROPLASTY Right 12/19/2019    Procedure: ARTHROPLASTY, HIP, TOTAL, ANTERIOR APPROACH;  Surgeon: Lm Hong M.D.;  Location: SURGERY [facility];  Service: Orthopedics   • CATARACT EXTRACTION WITH IOL Bilateral 2016   • KNEE ARTHROPLASTY TOTAL Left 2013   • ARTHROSCOPY, KNEE     • CARPAL TUNNEL RELEASE     • EAR RECONSTRUCTION     • SHOULDER ARTHROPLASTY TOTAL         CURRENT MEDICATIONS  Home Medications    **Home medications have not yet been reviewed for this encounter**         ALLERGIES  Allergies   Allergen Reactions   • Ibuprofen Hives and Itching     Pt can take aleve   • Codeine      itching   • Latex      Rash and redness from a latex dressing   • Tape      rash       PHYSICAL EXAM  VITAL SIGNS: /74   Pulse 75   Temp 36.7 °C (98.1 °F) (Tympanic)   Resp 18   Ht 1.753 m (5' 9\")   Wt 102.8 kg (226 lb 10.1 oz)   SpO2 96%   BMI 33.47 kg/m²    Gen: Alert, no acute distress  HENT: ATNC  Eyes: Normal conjunctiva  Neck: trachea midline  Resp: no respiratory distress  CV: No JVD, regular rate and  Abd: non-distended, nontender  Ext: No deformities.  Normal varus, valgus, Lachman test of the left knee.  No erythema.  Able to range the hip and knee without pain.  Tenderness to palpation over left thigh without erythema or swelling.  Distal CSMs intact.  Psych: normal mood  Neuro: speech fluent       RADIOLOGY/PROCEDURES  DX-KNEE COMPLETE 4+ LEFT   Final Result      No acute bony abnormality.               INTERPRETING LOCATION: [address] [place] [address], [facility]      DX-HIP-COMPLETE - UNILATERAL 2+ LEFT   Final Result      No acute bony abnormality.          LABS  Labs Reviewed   CBC WITH DIFFERENTIAL - Abnormal; Notable for the following components:       Result Value    RBC " 4.61 (*)     Hemoglobin 13.9 (*)     Hematocrit 41.2 (*)     All other components within normal limits   BASIC METABOLIC PANEL - Abnormal; Notable for the following components:    Glucose 114 (*)     All other components within normal limits   CREATINE KINASE - Abnormal; Notable for the following components:    CPK Total 548 (*)     All other components within normal limits   ESTIMATED GFR          COURSE & MEDICAL DECISION MAKING  Pertinent Labs & Imaging studies reviewed. (See chart for details)    Patient presents with report of pain to his hip, however his pain seems to be worse with firing of the quadriceps muscle.  He is tender along the thigh.  Based on this tenderness, this does not appear to fit with meralgia paresthetica.  This also does not fit a sciatic distribution.  He is able to range all joints, low suspicion for septic arthritis.  Labs demonstrate no leukocytosis to suggest infection or pyomyositis.  Low suspicion for deep space infection or cellulitis based on the patient's examination.  He does have a mildly elevated CPK, however reassuring creatinine.  Likely myositis, etiology unclear.  Possible overuse injury given his marked increase in walking prior to the onset of symptoms versus viral myositis versus idiopathic.  A bedside ultrasound was performed of the knee, which did not demonstrate any significant effusion.  Low suspicion for gout.  Low suspicion for pseudogout.    Patient advised to take nonsteroidal anti-inflammatory medications, however he reports significant pain with these.  He has a walker at home which she prefers to use over crutches which I have offered him.  He was provided with return precautions and anticipatory guidance as well as advised to follow-up with his regular doctor.    In prescribing controlled substances to this patient, I certify that I have obtained and reviewed the medical history of Terrell White. I have also made a good alonzo effort to obtain  applicable records from other providers who have treated the patient and records did not demonstrate any increased risk of substance abuse that would prevent me from prescribing controlled substances.     I have conducted a physical exam and documented it. I have reviewed Mr. White’s prescription history as maintained by the [place] Prescription Monitoring Program.     I have assessed the patient’s risk for abuse, dependency, and addiction using the validated Opioid Risk Tool available at https://www.[employer]cMacheen.com/gpbqqu-hneq-ebjh-ort-narcotic-abuse.     Given the above, I believe the benefits of controlled substance therapy outweigh the risks. The reasons for prescribing controlled substances include non-narcotic, oral analgesic alternatives have been inadequate for pain control. Accordingly, I have discussed the risk and benefits, treatment plan, and alternative therapies with the patient.   The risks of the medication, including constipation, addiction and altered mental status were discussed with the patient and they expressed understanding. They were encouraged to use the minimum dose necessary to control their breakthrough pain.    Patient will be discharged home in stable condition.    FINAL IMPRESSION  1. Pain of left thigh    2. Myositis of left thigh, unspecified myositis type

## 2020-03-04 ENCOUNTER — HOSPITAL ENCOUNTER (OUTPATIENT)
Dept: LAB | Facility: MEDICAL CENTER | Age: 65
End: 2020-03-04
Attending: FAMILY MEDICINE
Payer: COMMERCIAL

## 2020-03-04 LAB
25(OH)D3 SERPL-MCNC: 18 NG/ML (ref 30–100)
ALBUMIN SERPL BCP-MCNC: 4.1 G/DL (ref 3.2–4.9)
ALBUMIN/GLOB SERPL: 1.4 G/DL
ALP SERPL-CCNC: 86 U/L (ref 30–99)
ALT SERPL-CCNC: 15 U/L (ref 2–50)
ANION GAP SERPL CALC-SCNC: 12 MMOL/L (ref 0–11.9)
AST SERPL-CCNC: 26 U/L (ref 12–45)
BASOPHILS # BLD AUTO: 0.6 % (ref 0–1.8)
BASOPHILS # BLD: 0.05 K/UL (ref 0–0.12)
BILIRUB SERPL-MCNC: 0.4 MG/DL (ref 0.1–1.5)
BUN SERPL-MCNC: 20 MG/DL (ref 8–22)
CALCIUM SERPL-MCNC: 8.9 MG/DL (ref 8.5–10.5)
CHLORIDE SERPL-SCNC: 106 MMOL/L (ref 96–112)
CK SERPL-CCNC: 1071 U/L (ref 0–154)
CO2 SERPL-SCNC: 23 MMOL/L (ref 20–33)
CREAT SERPL-MCNC: 0.86 MG/DL (ref 0.5–1.4)
CRP SERPL HS-MCNC: 0.8 MG/L (ref 0–7.5)
EOSINOPHIL # BLD AUTO: 0.14 K/UL (ref 0–0.51)
EOSINOPHIL NFR BLD: 1.7 % (ref 0–6.9)
ERYTHROCYTE [DISTWIDTH] IN BLOOD BY AUTOMATED COUNT: 45.3 FL (ref 35.9–50)
ERYTHROCYTE [SEDIMENTATION RATE] IN BLOOD BY WESTERGREN METHOD: 8 MM/HOUR (ref 0–20)
GLOBULIN SER CALC-MCNC: 2.9 G/DL (ref 1.9–3.5)
GLUCOSE SERPL-MCNC: 127 MG/DL (ref 65–99)
HCT VFR BLD AUTO: 42.1 % (ref 42–52)
HGB BLD-MCNC: 13.9 G/DL (ref 14–18)
IMM GRANULOCYTES # BLD AUTO: 0.02 K/UL (ref 0–0.11)
IMM GRANULOCYTES NFR BLD AUTO: 0.2 % (ref 0–0.9)
LYMPHOCYTES # BLD AUTO: 1.63 K/UL (ref 1–4.8)
LYMPHOCYTES NFR BLD: 20.2 % (ref 22–41)
MCH RBC QN AUTO: 30.5 PG (ref 27–33)
MCHC RBC AUTO-ENTMCNC: 33 G/DL (ref 33.7–35.3)
MCV RBC AUTO: 92.5 FL (ref 81.4–97.8)
MONOCYTES # BLD AUTO: 0.57 K/UL (ref 0–0.85)
MONOCYTES NFR BLD AUTO: 7.1 % (ref 0–13.4)
NEUTROPHILS # BLD AUTO: 5.66 K/UL (ref 1.82–7.42)
NEUTROPHILS NFR BLD: 70.2 % (ref 44–72)
NRBC # BLD AUTO: 0 K/UL
NRBC BLD-RTO: 0 /100 WBC
PLATELET # BLD AUTO: 196 K/UL (ref 164–446)
PMV BLD AUTO: 11.5 FL (ref 9–12.9)
POTASSIUM SERPL-SCNC: 3.7 MMOL/L (ref 3.6–5.5)
PROT SERPL-MCNC: 7 G/DL (ref 6–8.2)
RBC # BLD AUTO: 4.55 M/UL (ref 4.7–6.1)
SODIUM SERPL-SCNC: 141 MMOL/L (ref 135–145)
TSH SERPL DL<=0.005 MIU/L-ACNC: 1.15 UIU/ML (ref 0.38–5.33)
WBC # BLD AUTO: 8.1 K/UL (ref 4.8–10.8)

## 2020-03-04 PROCEDURE — 86141 C-REACTIVE PROTEIN HS: CPT

## 2020-03-04 PROCEDURE — 85652 RBC SED RATE AUTOMATED: CPT

## 2020-03-04 PROCEDURE — 82306 VITAMIN D 25 HYDROXY: CPT

## 2020-03-04 PROCEDURE — 82550 ASSAY OF CK (CPK): CPT

## 2020-03-04 PROCEDURE — 85025 COMPLETE CBC W/AUTO DIFF WBC: CPT

## 2020-03-04 PROCEDURE — 84443 ASSAY THYROID STIM HORMONE: CPT

## 2020-03-04 PROCEDURE — 36415 COLL VENOUS BLD VENIPUNCTURE: CPT

## 2020-03-04 PROCEDURE — 80053 COMPREHEN METABOLIC PANEL: CPT

## 2020-05-05 ENCOUNTER — PHYSICAL THERAPY (OUTPATIENT)
Dept: PHYSICAL THERAPY | Facility: REHABILITATION | Age: 65
End: 2020-05-05
Attending: SPECIALIST
Payer: COMMERCIAL

## 2020-05-05 DIAGNOSIS — M54.41 ACUTE RIGHT-SIDED LOW BACK PAIN WITH RIGHT-SIDED SCIATICA: ICD-10-CM

## 2020-05-05 PROCEDURE — 97110 THERAPEUTIC EXERCISES: CPT

## 2020-05-05 PROCEDURE — 97161 PT EVAL LOW COMPLEX 20 MIN: CPT

## 2020-05-05 SDOH — ECONOMIC STABILITY: GENERAL: QUALITY OF LIFE: FAIR

## 2020-05-05 ASSESSMENT — ENCOUNTER SYMPTOMS
PAIN SCALE AT LOWEST: 3
PAIN SCALE AT HIGHEST: 10
PAIN SCALE: 6

## 2020-05-05 NOTE — OP THERAPY EVALUATION
Outpatient Physical Therapy  INITIAL EVALUATION    [facility] [address]  [address]  [place] [address]  Phone:  [phone]  Fax:  [phone]    Date of Evaluation: 2020    Patient: Terrell White  YOB: 1955  MRN: [id number]     Referring Provider: Anuj Glaser M.D.  [address]  [place] [address]   Referring Diagnosis Cervicalgia [M54.2]     Time Calculation  Start time: 0900  Stop time: 1000 Time Calculation (min): 60 minutes       Physical Therapy Occurrence Codes    Date of onset of impairment:  20   Date physical therapy care plan established or reviewed:  20   Date physical therapy treatment started:  20          Chief Complaint: Back Problem    Visit Diagnoses     ICD-10-CM   1. Acute right-sided low back pain with right-sided sciatica M54.41         Subjective:   History of Present Illness:     Date of onset:  2020  Quality of life:  Fair  Prior level of function:  On and off symptoms for about one year  Pain:     Current pain ratin    At best pain rating:  3    At worst pain rating:  10  Diagnostic Tests:     X-ray: abnormal    Activities of Daily Living:     Patient reported ADL status: Limited with bending  Limited with lifting  Limited with walking  Limited transfers  Limited with dressing; unable to bend  Patient Goals:     Patient goals for therapy:  Increased strength, decreased pain and increased motion    Patient is a 64 y.o. male that presents to therapy with back and leg pain R. States that symptoms were due to injury, fall. Reports the pain quality to be sharp, constant and are primarily back down into the buttock R. Reports that symptoms now  not changing. States that aggravating factors are bending, lifting, twisting, walking. States that easng factors are meds. Notes numbness along the leg.   Past Medical History:   Diagnosis Date   • Arthritis 2019    osteo   • Back pain    •  Cataract     IOL   • Chickenpox    • Heart burn 12/17/2019   • Hepatitis B    • Influenza    • Sleep apnea 12/17/2019    + cpap   • Snoring    • TMJ (dislocation of temporomandibular joint)    • Tonsillitis      Past Surgical History:   Procedure Laterality Date   • PB TOTAL HIP ARTHROPLASTY Right 12/19/2019    Procedure: ARTHROPLASTY, HIP, TOTAL, ANTERIOR APPROACH;  Surgeon: Lm Hong M.D.;  Location: SURGERY [facility];  Service: Orthopedics   • CATARACT EXTRACTION WITH IOL Bilateral 2016   • KNEE ARTHROPLASTY TOTAL Left 2013   • ARTHROSCOPY, KNEE     • CARPAL TUNNEL RELEASE     • EAR RECONSTRUCTION     • SHOULDER ARTHROPLASTY TOTAL       Social History     Tobacco Use   • Smoking status: Current Every Day Smoker     Packs/day: 0.25     Years: 21.00     Pack years: 5.25     Types: Cigarettes   • Smokeless tobacco: Never Used   Substance Use Topics   • Alcohol use: Yes     Alcohol/week: 0.0 - 3.0 oz     Frequency: 2-3 times a week     Drinks per session: 3 or 4     Binge frequency: Less than monthly     Comment: 6 per week     Family and Occupational History     Socioeconomic History   • Marital status:      Spouse name: Not on file   • Number of children: Not on file   • Years of education: Not on file   • Highest education level: Not on file   Occupational History   • Not on file       Objective     Postural Observations  Seated posture: poor  Standing posture: poor        Neurological Testing     Reflexes   Left   Patellar (L4): trace (1+)  Achilles (S1): normal (2+)  Ankle clonus reflex: negative  Babinski sign: negative    Right   Patellar (L4): trace (1+)  Achilles (S1): normal (2+)  Ankle clonus reflex: negative  Babinski sign: negative    Myotome testing   Lumbar (left)   L1 (hip flexors): 5  L2 (hip flexors): 5  L3 (knee extensors): 5  L4 (ankle dorsiflexors): 5  L5 (great toe extension): 5  S1 (ankle plantar flexors): 5    Lumbar (right)   L1 (hip flexors): 5  L2 (hip flexors): 5  L3  (knee extensors): 5  L4 (ankle dorsiflexors): 5  L5 (great toe extension): 5  S1 (ankle plantar flexors): 4-    Dermatome testing   Lumbar (left)   All left lumbar dermatomes intact    Lumbar (right)   All right lumbar dermatomes intact    Additional Neurological Details  R lateral foot numbness    Active Range of Motion     Lumbar   Flexion: Lumbar active flexion: 65deg.  Extension: Lumbar active extension: 12deg.  Left lateral flexion: Left lateral lumbar spine flexion: 36deg.  Right lateral flexion: Right lateral lumbar spine flexion: 29deg.    Joint Play   Spine     Central PA [place]        T10: hypomobile       T11: hypomobile       T12: hypomobile       L1: hypomobile       L2: hypomobile       L3: hypomobile       L4: hypomobile       L5: hypomobile        Strength:      Left Hip   Planes of Motion   Abduction: 4-  Adduction: 3+    Right Hip   Planes of Motion   Abduction: 4-  Adduction: 3+    Left Knee   Flexion: 5    Right Knee   Flexion: 5    Tests     Left Hip   SLR: Negative.     Right Hip   SLR: Positive.     Additional Tests Details  Traction (-)    Matilda LumbarTest     Standing repeated motions:   Pre-test symptoms: Modified to seated  Extension in standing     Symptoms during testing: decreases    Symptoms after testing: better  Repeat extension in standing     Symptoms during testing: decreases    Symptoms after testing: better        Therapeutic Exercises (CPT 66476):     1. Postural edu, 5min    2. Seated ext with lumbar roll / standing mini ext possibility to progress to JUSTIN, x10min      Time-based treatments/modalities:  Therapeutic exercise minutes (CPT 26866): 15 minutes       Assessment, Response and Plan:   Impairments: abnormal or restricted ROM, impaired physical strength and pain with function    Assessment details:  Patient presents with signs and symptoms consistent with a lumbar derangement vs functional instability. Patient limitations include weakness, decreased ROM, and pain.  Patient demonstrated a possible extension bias. Patient will benefit from skilled therapy to improve the aforementioned deficits and decrease further functional decline.   Goals:   Short Term Goals:   1) Patient's lumbar flexion will improve by 20deg to facilitate knee level activity.  2) Patient's symptoms will improve to facilitate sitting >30min.  Short term goal time span:  2-4 weeks      Long Term Goals:    1) Patient's symptoms will improve to allow for ambulation >100ft.  2) Patient's RMQ will improve by 10% to demonstrate functional improvement  Long term goal time span:  6-8 weeks    Plan:   Therapy options:  Physical therapy treatment to continue  Planned therapy interventions:  E Stim Unattended (CPT 91823), Manual Therapy (CPT 69480), Neuromuscular Re-education (CPT 97931), Therapeutic Exercise (CPT 39702) and Hot or Cold Pack Therapy (CPT 88149)  Frequency:  2x week  Duration in weeks:  8  Discussed with:  Patient      Functional Assessment Used  Eddi Aneudy Low Back Pain and Disability Score: 75     Referring provider co-signature:  I have reviewed this plan of care and my co-signature certifies the need for services.  Certification Dates:   From 05/05/20   To 06/30/20    Physician Signature: ________________________________ Date: ______________

## 2020-05-07 ENCOUNTER — PHYSICAL THERAPY (OUTPATIENT)
Dept: PHYSICAL THERAPY | Facility: REHABILITATION | Age: 65
End: 2020-05-07
Attending: SPECIALIST
Payer: COMMERCIAL

## 2020-05-07 DIAGNOSIS — M54.41 ACUTE RIGHT-SIDED LOW BACK PAIN WITH RIGHT-SIDED SCIATICA: ICD-10-CM

## 2020-05-07 PROCEDURE — 97110 THERAPEUTIC EXERCISES: CPT

## 2020-05-07 PROCEDURE — 97014 ELECTRIC STIMULATION THERAPY: CPT

## 2020-05-07 PROCEDURE — 97112 NEUROMUSCULAR REEDUCATION: CPT

## 2020-05-08 NOTE — OP THERAPY DAILY TREATMENT
Outpatient Physical Therapy  DAILY TREATMENT     [facility] Physical [address].  [address]  [place] [address]  Phone:  [phone]  Fax:  [phone]    Date: 05/07/2020    Patient: Terrell White  YOB: 1955  MRN: [id number]     Time Calculation  Start time: 1400  Stop time: 1445 Time Calculation (min): 45 minutes       Chief Complaint: Hip Problem and Back Problem    Visit #: 2    SUBJECTIVE:  Patient reports walking symptoms are the same. Notes that sit to stand is improved with less pain.     OBJECTIVE:  Current objective measures:   (+) piriformis testing R  Continued minor decrease in symptoms with extension          Therapeutic Exercises (CPT 11120):     1. Basic tra edu, x5min     2. Basic tra with LE lifts, x10    3. Trial piriformis stretch in sitting/supine, DNT    4. Seated hip abd, x20    5. Trial prone on elbows, 2x1min    6. Standing ext mini, x10 every 3-4 hours    Therapeutic Treatments and Modalities:     1. E Stim Unattended (CPT 05365), IFC with HP x15min 80-150hz R glut    2. Neuromuscular Re-education (CPT 13168), Edu and demo of use of cane; temp use of cane to unweat hip    Time-based treatments/modalities:  Therapeutic exercise minutes (CPT 93328): 20 minutes  Neuromusc re-ed, balance, coor, post minutes (CPT 73343): 10 minutes       Pain rating before treatment: 5  Pain rating after treatment: 3    ASSESSMENT:   Response to treatment: Patient presents with possible signs of lumbar origin of symptoms vs possible piriformis / glut source of symptoms. Patient will continue with LE strength and stabilization program.     PLAN/RECOMMENDATIONS:   Plan for treatment: therapy treatment to continue next visit.  Planned interventions for next visit: continue with current treatment.

## 2020-05-14 ENCOUNTER — APPOINTMENT (OUTPATIENT)
Dept: PHYSICAL THERAPY | Facility: REHABILITATION | Age: 65
End: 2020-05-14
Attending: SPECIALIST
Payer: COMMERCIAL

## 2020-05-14 PROCEDURE — 97110 THERAPEUTIC EXERCISES: CPT

## 2020-05-14 PROCEDURE — 97014 ELECTRIC STIMULATION THERAPY: CPT

## 2020-05-14 NOTE — OP THERAPY DAILY TREATMENT
Outpatient Physical Therapy  DAILY TREATMENT     [facility] Physical Therapy [address].  [address]  [place] [address]  Phone:  [phone]  Fax:  [phone]    Date: 05/14/2020    Patient: Terrell White  YOB: 1955  MRN: [id number]     Time Calculation    Start time: 0730  Stop time: 0815 Time Calculation (min): 45 minutes         Chief Complaint: Hip Problem and Back Problem    Visit #: 3    SUBJECTIVE:  Patient reports about a 50% improvement while using his cane. Notes ease of walking with and without cane. Patient still notes c shaped pain about hip.       OBJECTIVE:  Current objective measures:   Continued pain over glut med          Therapeutic Exercises (CPT 26106):     1. Basic tra edu, x5min     2. Basic tra with LE lifts, x10    3. Trial piriformis stretch in sitting/supine, DNT    4. Seated hip abd, x20    5. Trial prone on elbows, 2x1min    6. Trial glut step, DNT    7. Clams, xfatigue    8. Supine hip abd, x30    9. Nu step , x8min    Therapeutic Treatments and Modalities:     1. E Stim Unattended (CPT 56789), IFC with HP x15min 80-150hz R glut    Time-based treatments/modalities:    Physical Therapy Timed Treatment Charges  Therapeutic exercise minutes (CPT 26767): 30 minutes      Pain rating (1-10) before treatment: 2  Pain rating (1-10) after treatment: 1    ASSESSMENT:   Patient responded well to therapy with continued decreased symptoms and improved function. Now suspect glut med dysfunction leading to hip pain and continued poor lumbar stability.     PLAN/RECOMMENDATIONS:   Plan for treatment: therapy treatment to continue next visit.  Planned interventions for next visit: continue with current treatment.

## 2020-05-19 ENCOUNTER — PHYSICAL THERAPY (OUTPATIENT)
Dept: PHYSICAL THERAPY | Facility: REHABILITATION | Age: 65
End: 2020-05-19
Attending: SPECIALIST
Payer: COMMERCIAL

## 2020-05-19 DIAGNOSIS — M54.41 ACUTE RIGHT-SIDED LOW BACK PAIN WITH RIGHT-SIDED SCIATICA: ICD-10-CM

## 2020-05-19 PROCEDURE — 97014 ELECTRIC STIMULATION THERAPY: CPT

## 2020-05-19 PROCEDURE — 97110 THERAPEUTIC EXERCISES: CPT

## 2020-05-19 NOTE — OP THERAPY DAILY TREATMENT
Outpatient Physical Therapy  DAILY TREATMENT     [facility] [place]  [address], [address]  [place] [address]  Phone:  [phone]  Fax:  [phone]    Date: 05/19/2020    Patient: Terrell White  YOB: 1955  MRN: [id number]     Time Calculation    Start time: 0730  Stop time: 0825 Time Calculation (min): 55 minutes         Chief Complaint: Back Problem and Hip Problem    Visit #: 4    SUBJECTIVE:  Patient reports that he is still doing better. 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 States he is paying for that today.     OBJECTIVE:  Current objective measures:   Able to SLS after SG reps          Therapeutic Exercises (CPT 33575):     1. Basic tra edu, x5min     2. Basic tra with LE lifts, x10    3. Trial piriformis stretch in sitting/supine, DNT    4. Seated hip abd, x20    5. Trial prone on elbows, 2x1min    6. Trial glut step, DNT    7. Clams, xfatigue    8. Supine hip abd, x30    9. Nu step , x10min    10. SG , x10, decrease; no better    11.  SG, x10, decrease better    12. SG, x10, decrease better    Therapeutic Treatments and Modalities:     1. E Stim Unattended (CPT 89433), IFC with HP x15min 80-150hz R glut    Time-based treatments/modalities:    Physical Therapy Timed Treatment Charges  Therapeutic exercise minutes (CPT 95218): 40 minutes      Pain rating (1-10) before treatment:  4  Pain rating (1-10) after treatment:  1    ASSESSMENT:   Response to treatment: Patient is slowly responding to therapy with an overall decrease in symptoms and improvement in function. Patient is still using the cane but is reporting improved function within the home.     PLAN/RECOMMENDATIONS:   Plan for treatment: therapy treatment to continue next visit.  Planned interventions for next visit: continue with current treatment.

## 2020-05-21 ENCOUNTER — PHYSICAL THERAPY (OUTPATIENT)
Dept: PHYSICAL THERAPY | Facility: REHABILITATION | Age: 65
End: 2020-05-21
Attending: SPECIALIST
Payer: COMMERCIAL

## 2020-05-21 DIAGNOSIS — M54.41 ACUTE RIGHT-SIDED LOW BACK PAIN WITH RIGHT-SIDED SCIATICA: ICD-10-CM

## 2020-05-21 PROCEDURE — 97112 NEUROMUSCULAR REEDUCATION: CPT

## 2020-05-21 PROCEDURE — 97110 THERAPEUTIC EXERCISES: CPT

## 2020-05-21 NOTE — OP THERAPY DAILY TREATMENT
Outpatient Physical Therapy  DAILY TREATMENT     [facility] [place]  [address], [address]  [place] [address]  Phone:  [phone]  Fax:  [phone]    Date: 05/21/2020    Patient: Terrell White  YOB: 1955  MRN: [id number]     Time Calculation    Start time: 0800  Stop time: 0853 Time Calculation (min): 53 minutes         Chief Complaint: Back Problem    Visit #: 5    SUBJECTIVE:  Patient reports that his back is doing better post injection. States he is feeling nearly back to normal.     OBJECTIVE:  Current objective measures:   Unable to stand mod tandem.           Therapeutic Exercises (CPT 26558):     4. Seated hip abd, x20    5. Clams, xfatigue    6. Glut step, x10    7. Clams, xfatigue    8. Supine hip abd, x30    9. Nu step , x10min    10. SG , x10, decrease; no better    11. Sit to stand training , x10min    Therapeutic Treatments and Modalities:     1. Neuromuscular Re-education (CPT 26879), gait training; weight shifting at bar; weight shifting with single step; corner balance with modifications from NBOS to mod tandem;     Time-based treatments/modalities:    Physical Therapy Timed Treatment Charges  Neuromusc re-ed, balance, coor, post minutes (CPT 47181): 28 minutes  Therapeutic exercise minutes (CPT 95073): 25 minutes      Pain rating (1-10) before treatment:  1  Pain rating (1-10) after treatment:  1    ASSESSMENT:   Response to treatment: Patient is responding well to therapy with no increase in symptoms and demonstrated ability to sit to stand with little difficulty.     PLAN/RECOMMENDATIONS:   Plan for treatment: therapy treatment to continue next visit.  Planned interventions for next visit: continue with current treatment.

## 2020-05-26 ENCOUNTER — PHYSICAL THERAPY (OUTPATIENT)
Dept: PHYSICAL THERAPY | Facility: REHABILITATION | Age: 65
End: 2020-05-26
Attending: SPECIALIST
Payer: COMMERCIAL

## 2020-05-26 DIAGNOSIS — M54.41 ACUTE RIGHT-SIDED LOW BACK PAIN WITH RIGHT-SIDED SCIATICA: ICD-10-CM

## 2020-05-26 PROCEDURE — 97110 THERAPEUTIC EXERCISES: CPT

## 2020-05-26 PROCEDURE — 97112 NEUROMUSCULAR REEDUCATION: CPT

## 2020-05-26 NOTE — OP THERAPY DAILY TREATMENT
Outpatient Physical Therapy  DAILY TREATMENT     [facility] [place]  [address], [address]  [place] [address]  Phone:  [phone]  Fax:  [phone]    Date: 05/26/2020    Patient: Terrell White  YOB: 1955  MRN: [id number]     Time Calculation    Start time: 0730  Stop time: 0825 Time Calculation (min): 55 minutes         Chief Complaint: Back Problem    Visit #: 6    SUBJECTIVE:  Patient reports that symptoms have eased. States that overall he can walk his dog again.     OBJECTIVE:  Current objective measures:   Lumbar flexion 100deg  PT Functional Assessment Tool Used: RMQ  PT Functional Assessment Score: 1/24       Therapeutic Exercises (CPT 66119):     4. Seated hip abd, x20    5. Clams, xfatigue    6. Glut step, x10    7. Clams, xfatigue    8. Supine hip abd, x30    9. Nu step , x10min    10. SG , x10, decrease; no better    11. Gait training with no a.d. cues for form, x10min    Therapeutic Treatments and Modalities:     1. Neuromuscular Re-education (CPT 76621), gait training; weight shifting at bar; weight shifting with single step; corner balance with modifications from NBOS to mod tandem; now training in corner with eyes closed and simualted LOB backwards     Time-based treatments/modalities:    Physical Therapy Timed Treatment Charges  Neuromusc re-ed, balance, coor, post minutes (CPT 01392): 25 minutes  Therapeutic exercise minutes (CPT 21736): 30 minutes      Pain rating (1-10) before treatment:  0  Pain rating (1-10) after treatment:  0    ASSESSMENT:   Response to treatment: Patient responded well to therapy with an overall decrease in symptoms    PLAN/RECOMMENDATIONS:   Plan for treatment: therapy treatment to continue next visit.  Planned interventions for next visit: continue with current treatment.

## 2020-05-26 NOTE — OP THERAPY PROGRESS SUMMARY
Outpatient Physical Therapy  PROGRESS SUMMARY NOTE      [facility] [place]  [address], [address]  [place] [address]  Phone:  [phone]  Fax:  [phone]    Date of Visit: 05/26/2020    Patient: Terrell White  YOB: 1955  MRN: [id number]     Referring Provider: [address]  [place] [address]   Referring Diagnosis Cervicalgia [M54.2]     Visit Diagnoses     ICD-10-CM   1. Acute right-sided low back pain with right-sided sciatica  M54.41       Rehab Potential: good    Progress Report Period: 5/5/20 to 5/26/20    Functional Assessment Used  PT Functional Assessment Tool Used: RMQ  PT Functional Assessment Score: 1/24       Objective Findings and Assessment:   Patient progression towards goals: Patient has been seen for 6 visits. Patient reports over a 90% improvement in symptoms. Patient is now back to walking his dog. States that the most he has is pain in the back.     Objective findings and assessment details: Lumbar ROM  Flexion: 100deg    Goals:   Short Term Goals:   1) Patient's lumbar flexion will improve by 20deg to facilitate knee level activity. MET  2) Patient's symptoms will improve to facilitate sitting >30min. MET  Short term goal time span:  2-4 weeks      Long Term Goals:    1) Patient's symptoms will improve to allow for ambulation >100ft. MET  2) Patient's RMQ will improve by 10% to demonstrate functional improvement MET  Long term goal time span:  6-8 weeks    Plan:   Planned therapy interventions:  E Stim Unattended (CPT 52477), Gait Training (CPT 91016), Manual Therapy (CPT 80769), Neuromuscular Re-education (CPT 67827) and Therapeutic Exercise (CPT 89624)  Frequency: 1-2x per week.  Duration in weeks:  6        Referring provider co-signature:  I have reviewed this plan of care and my co-signature certifies the need for services.    Physician Signature: ________________________________ Date:  ______________

## 2020-05-26 NOTE — Clinical Note
May 26, 2020    Anuj Glaser M.D.  [address]  [place] [address]    Patient: Terrell White   YOB: 1955   Date of Visit: 5/26/2020       Dear Anuj Glaser M.d.  [address]  [place] [address] [phone]    The attached plan of care is being sent to you because your patient’s medical reimbursement requires that you certify the plan of care. Your signature is required to allow uninterrupted insurance coverage.      You may indicate your approval by signing below and faxing this form back to us at Dept Fax: [phone].    Please call Dept: [phone] with any questions or concerns.    Thank you for this referral,        Marc Basurto PT, DPT  [facility] OUTPATIENT PHYSICAL THERAPY [address] [address] [phone]    Payer: Payor: [email or web address] / Plan: [facility]  / Product Type: *No Product type* /                                                                         Date:     Dear Marc Basurto PT, DPT,     Re: Mr. Terrell White, MRN:[id number]    I certify that I have reviewed the attached plan of care and it is medically necessary for Mr. Terrell White (1955) who is under my care.          ______________________________________                    _________________  Provider name and credentials                                           Date and time                                                                                           Specialty Plan of Care 05/26/20   Effective from: 5/26/2020  Effective to: 7/7/2020    Plan ID: [id number]           Participants     Name Type Comments Contact Info    Anuj Glaser M.D. Provider  [phone]    Marc Basurto PT, DPT PT        Evaluation/Progress Summary     Author: Marc Basurto PT DPT Status: Sign when Signing Visit Last edited: 5/26/2020  7:30 AM         Outpatient Physical  Therapy  {PROGRESS/RE-EVAL:[id number]} NOTE      [facility] [place]  [address]  [place] [address]  Phone:  [phone]  Fax:  [phone]    Date of Visit: 05/26/2020    Patient: Terrell White  YOB: 1955  MRN: [id number]     Referring Provider: Anuj Glaser M.D.  [address]  [place] [address]   Referring Diagnosis Cervicalgia [M54.2]     Visit Diagnoses     ICD-10-CM   1. Acute right-sided low back pain with right-sided sciatica  M54.41       Rehab Potential: good    Progress Report Period: 5/5/20 to 5/26/20    Functional Assessment Used  PT Functional Assessment Tool Used: RMQ  PT Functional Assessment Score: 1/24       Objective Findings and Assessment:   Patient progression towards goals: Patient has been seen for 6 visits. Patient reports over a 90% improvement in symptoms. Patient is now back to walking his dog. States that the most he has is pain in the back.     Objective findings and assessment details: Lumbar ROM  Flexion: 100deg    Goals:   Short Term Goals:   1) Patient's lumbar flexion will improve by 20deg to facilitate knee level activity. MET  2) Patient's symptoms will improve to facilitate sitting >30min. MET  Short term goal time span:  2-4 weeks      Long Term Goals:    1) Patient's symptoms will improve to allow for ambulation >100ft. MET  2) Patient's RMQ will improve by 10% to demonstrate functional improvement MET  Long term goal time span:  6-8 weeks    Plan:   Planned therapy interventions:  E Stim Unattended (CPT 52544), Gait Training (CPT 02617), Manual Therapy (CPT 84535), Neuromuscular Re-education (CPT 13204) and Therapeutic Exercise (CPT 79308)  Frequency: 1-2x per week.  Duration in weeks:  6        Referring provider co-signature:  I have reviewed this plan of care and my co-signature certifies the need for services.    Physician Signature: ________________________________ Date: ______________                Updated Participants     Name Type Comments Contact Info    Anuj Glaser M.D. Provider  [phone]    Signature pending    Marc Basurto, PT, DPT PT

## 2020-05-26 NOTE — Clinical Note
May 26, 2020    Anuj Glaser M.D.  [address]  [place] [address]    Patient: Terrell White   YOB: 1955   Date of Visit: 5/26/2020       Dear Anuj Glasre M.d.  [address]  [place] [address] [phone]    The attached plan of care is being sent to you because your patient’s medical reimbursement requires that you certify the plan of care. Your signature is required to allow uninterrupted insurance coverage.      You may indicate your approval by signing below and faxing this form back to us at Dept Fax: [phone].    Please call Dept: [phone] with any questions or concerns.    Thank you for this referral,        Marc Basurto PT, DPT  [facility] OUTPATIENT PHYSICAL THERAPY [address] [address] [phone]    Payer: Payor: [employer] / Plan: [facility]  / Product Type: *No Product type* /                                                                         Date:     Dear Marc Basurto PT, DPT,     Re: Mr. Terrell White, MRN:[id number]    I certify that I have reviewed the attached plan of care and it is medically necessary for Mr. Terrell White (1955) who is under my care.          ______________________________________                    _________________  Provider name and credentials                                           Date and time                                                                                           Specialty Plan of Care 05/26/20   Effective from: 5/26/2020  Effective to: 7/7/2020    Plan ID: [id number]           Participants     Name Type Comments Contact Info    Anuj Glaser M.D. Provider  [phone]    Marc Basurto PT, DPT PT        Evaluation/Progress Summary     Author: Marc Basurto PT, DPT Status: Signed Last edited: 5/26/2020  7:30 AM         Outpatient Physical Therapy  PROGRESS SUMMARY  NOTE      [facility] [place]  [address]  [place] [address]  Phone:  [phone]  Fax:  [phone]    Date of Visit: 05/26/2020    Patient: Terrell White  YOB: 1955  MRN: [id number]     Referring Provider: [address]  [address]  [place] [address]   Referring Diagnosis Cervicalgia [M54.2]     Visit Diagnoses     ICD-10-CM   1. Acute right-sided low back pain with right-sided sciatica  M54.41       Rehab Potential: good    Progress Report Period: 5/5/20 to 5/26/20    Functional Assessment Used  PT Functional Assessment Tool Used: RMQ  PT Functional Assessment Score: 1/24       Objective Findings and Assessment:   Patient progression towards goals: Patient has been seen for 6 visits. Patient reports over a 90% improvement in symptoms. Patient is now back to walking his dog. States that the most he has is pain in the back.     Objective findings and assessment details: Lumbar ROM  Flexion: 100deg    Goals:   Short Term Goals:   1) Patient's lumbar flexion will improve by 20deg to facilitate knee level activity. MET  2) Patient's symptoms will improve to facilitate sitting >30min. MET  Short term goal time span:  2-4 weeks      Long Term Goals:    1) Patient's symptoms will improve to allow for ambulation >100ft. MET  2) Patient's RMQ will improve by 10% to demonstrate functional improvement MET  Long term goal time span:  6-8 weeks    Plan:   Planned therapy interventions:  E Stim Unattended (CPT 49956), Gait Training (CPT 47781), Manual Therapy (CPT 57733), Neuromuscular Re-education (CPT 61500) and Therapeutic Exercise (CPT 29458)  Frequency: 1-2x per week.  Duration in weeks:  6        Referring provider co-signature:  I have reviewed this plan of care and my co-signature certifies the need for services.    Physician Signature: ________________________________ Date: ______________               Updated Participants     Name  Type Comments Contact Info    Anuj Glaser M.D. Provider  [phone]    Signature pending    Marc Basurto, PT, DPT PT

## 2020-05-28 ENCOUNTER — PHYSICAL THERAPY (OUTPATIENT)
Dept: PHYSICAL THERAPY | Facility: REHABILITATION | Age: 65
End: 2020-05-28
Attending: SPECIALIST
Payer: COMMERCIAL

## 2020-05-28 DIAGNOSIS — M54.41 ACUTE RIGHT-SIDED LOW BACK PAIN WITH RIGHT-SIDED SCIATICA: ICD-10-CM

## 2020-05-28 PROCEDURE — 97110 THERAPEUTIC EXERCISES: CPT

## 2020-05-28 PROCEDURE — 97112 NEUROMUSCULAR REEDUCATION: CPT

## 2020-05-28 NOTE — OP THERAPY DAILY TREATMENT
Outpatient Physical Therapy  DAILY TREATMENT     [facility] [place]  [address]  [place] [address]  Phone:  [phone]  Fax:  [phone]    Date: 05/28/2020    Patient: Terrell White  YOB: 1955  MRN: [id number]     Time Calculation    Start time: 0730  Stop time: 0815 Time Calculation (min): 45 minutes         Chief Complaint: Back Problem and Hip Problem    Visit #: 7    SUBJECTIVE:  Patient reports that he is feeling great. 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     OBJECTIVE:  Current objective measures:   Continued difficulty with transfer of weight          Therapeutic Exercises (CPT 20588):     4. Seated hip abd, x20    5. Clams, xfatigue    6. Glut step, x10    7. Clams, xfatigue    8. Supine hip abd, x30    9. Nu step , x10min    10. SG , x10, decrease; no better    11. Gait training with no a.d. cues for form, x10min    Therapeutic Treatments and Modalities:     1. Neuromuscular Re-education (CPT 76402), gait training; weight shifting at bar; weight shifting with single step; alingment march    Time-based treatments/modalities:    Physical Therapy Timed Treatment Charges  Neuromusc re-ed, balance, coor, post minutes (CPT 63755): 15 minutes  Therapeutic exercise minutes (CPT 97315): 30 minutes      Pain rating (1-10) before treatment:  0  Pain rating (1-10) after treatment:  0    ASSESSMENT:   Response to treatment: Patient responded well to therapy and is now reporting that he is pain free.     PLAN/RECOMMENDATIONS:   Plan for treatment: Patient will now trial full HEP for 3 weeks.  Planned interventions for next visit: Discharge after 3 weeks if no follow up.

## 2020-06-03 ENCOUNTER — APPOINTMENT (OUTPATIENT)
Dept: PHYSICAL THERAPY | Facility: REHABILITATION | Age: 65
End: 2020-06-03
Attending: SPECIALIST
Payer: COMMERCIAL

## 2020-06-05 ENCOUNTER — APPOINTMENT (OUTPATIENT)
Dept: PHYSICAL THERAPY | Facility: REHABILITATION | Age: 65
End: 2020-06-05
Attending: SPECIALIST
Payer: COMMERCIAL

## 2020-06-10 ENCOUNTER — APPOINTMENT (OUTPATIENT)
Dept: PHYSICAL THERAPY | Facility: REHABILITATION | Age: 65
End: 2020-06-10
Attending: SPECIALIST
Payer: COMMERCIAL

## 2020-06-12 ENCOUNTER — APPOINTMENT (OUTPATIENT)
Dept: PHYSICAL THERAPY | Facility: REHABILITATION | Age: 65
End: 2020-06-12
Attending: SPECIALIST
Payer: COMMERCIAL

## 2020-06-17 ENCOUNTER — APPOINTMENT (OUTPATIENT)
Dept: PHYSICAL THERAPY | Facility: REHABILITATION | Age: 65
End: 2020-06-17
Attending: SPECIALIST
Payer: COMMERCIAL

## 2020-06-19 ENCOUNTER — APPOINTMENT (OUTPATIENT)
Dept: PHYSICAL THERAPY | Facility: REHABILITATION | Age: 65
End: 2020-06-19
Attending: SPECIALIST
Payer: COMMERCIAL

## 2020-06-23 ENCOUNTER — APPOINTMENT (OUTPATIENT)
Dept: PHYSICAL THERAPY | Facility: REHABILITATION | Age: 65
End: 2020-06-23
Attending: SPECIALIST
Payer: COMMERCIAL

## 2020-06-25 ENCOUNTER — APPOINTMENT (OUTPATIENT)
Dept: PHYSICAL THERAPY | Facility: REHABILITATION | Age: 65
End: 2020-06-25
Attending: SPECIALIST
Payer: COMMERCIAL

## 2020-10-26 ENCOUNTER — HOSPITAL ENCOUNTER (OUTPATIENT)
Dept: LAB | Facility: MEDICAL CENTER | Age: 65
End: 2020-10-26
Attending: FAMILY MEDICINE
Payer: COMMERCIAL

## 2020-10-26 LAB
COVID ORDER STATUS COVID19: NORMAL
SARS-COV-2 RNA RESP QL NAA+PROBE: NOTDETECTED
SPECIMEN SOURCE: NORMAL

## 2020-10-26 PROCEDURE — C9803 HOPD COVID-19 SPEC COLLECT: HCPCS

## 2020-10-26 PROCEDURE — U0003 INFECTIOUS AGENT DETECTION BY NUCLEIC ACID (DNA OR RNA); SEVERE ACUTE RESPIRATORY SYNDROME CORONAVIRUS 2 (SARS-COV-2) (CORONAVIRUS DISEASE [COVID-19]), AMPLIFIED PROBE TECHNIQUE, MAKING USE OF HIGH THROUGHPUT TECHNOLOGIES AS DESCRIBED BY CMS-2020-01-R: HCPCS

## 2021-05-03 ENCOUNTER — HOSPITAL ENCOUNTER (OUTPATIENT)
Dept: LAB | Facility: MEDICAL CENTER | Age: 66
End: 2021-05-03
Attending: FAMILY MEDICINE
Payer: COMMERCIAL

## 2021-05-03 LAB
ALBUMIN SERPL BCP-MCNC: 4.5 G/DL (ref 3.2–4.9)
ALBUMIN/GLOB SERPL: 1.6 G/DL
ALP SERPL-CCNC: 88 U/L (ref 30–99)
ALT SERPL-CCNC: 14 U/L (ref 2–50)
ANION GAP SERPL CALC-SCNC: 8 MMOL/L (ref 7–16)
AST SERPL-CCNC: 17 U/L (ref 12–45)
BASOPHILS # BLD AUTO: 0.6 % (ref 0–1.8)
BASOPHILS # BLD: 0.03 K/UL (ref 0–0.12)
BILIRUB SERPL-MCNC: 0.7 MG/DL (ref 0.1–1.5)
BUN SERPL-MCNC: 15 MG/DL (ref 8–22)
CALCIUM SERPL-MCNC: 9 MG/DL (ref 8.5–10.5)
CHLORIDE SERPL-SCNC: 107 MMOL/L (ref 96–112)
CHOLEST SERPL-MCNC: 216 MG/DL (ref 100–199)
CO2 SERPL-SCNC: 26 MMOL/L (ref 20–33)
CREAT SERPL-MCNC: 0.75 MG/DL (ref 0.5–1.4)
EOSINOPHIL # BLD AUTO: 0.17 K/UL (ref 0–0.51)
EOSINOPHIL NFR BLD: 3.7 % (ref 0–6.9)
ERYTHROCYTE [DISTWIDTH] IN BLOOD BY AUTOMATED COUNT: 44.1 FL (ref 35.9–50)
FASTING STATUS PATIENT QL REPORTED: NORMAL
GLOBULIN SER CALC-MCNC: 2.8 G/DL (ref 1.9–3.5)
GLUCOSE SERPL-MCNC: 121 MG/DL (ref 65–99)
HCT VFR BLD AUTO: 45.4 % (ref 42–52)
HDLC SERPL-MCNC: 54 MG/DL
HGB BLD-MCNC: 14.8 G/DL (ref 14–18)
IMM GRANULOCYTES # BLD AUTO: 0.02 K/UL (ref 0–0.11)
IMM GRANULOCYTES NFR BLD AUTO: 0.4 % (ref 0–0.9)
LDLC SERPL CALC-MCNC: 134 MG/DL
LYMPHOCYTES # BLD AUTO: 1.38 K/UL (ref 1–4.8)
LYMPHOCYTES NFR BLD: 29.9 % (ref 22–41)
MCH RBC QN AUTO: 31 PG (ref 27–33)
MCHC RBC AUTO-ENTMCNC: 32.6 G/DL (ref 33.7–35.3)
MCV RBC AUTO: 95 FL (ref 81.4–97.8)
MONOCYTES # BLD AUTO: 0.39 K/UL (ref 0–0.85)
MONOCYTES NFR BLD AUTO: 8.4 % (ref 0–13.4)
NEUTROPHILS # BLD AUTO: 2.63 K/UL (ref 1.82–7.42)
NEUTROPHILS NFR BLD: 57 % (ref 44–72)
NRBC # BLD AUTO: 0 K/UL
NRBC BLD-RTO: 0 /100 WBC
PLATELET # BLD AUTO: 178 K/UL (ref 164–446)
PMV BLD AUTO: 11.6 FL (ref 9–12.9)
POTASSIUM SERPL-SCNC: 4.4 MMOL/L (ref 3.6–5.5)
PROT SERPL-MCNC: 7.3 G/DL (ref 6–8.2)
PSA SERPL-MCNC: 1.23 NG/ML (ref 0–4)
RBC # BLD AUTO: 4.78 M/UL (ref 4.7–6.1)
SODIUM SERPL-SCNC: 141 MMOL/L (ref 135–145)
TREPONEMA PALLIDUM IGG+IGM AB [PRESENCE] IN SERUM OR PLASMA BY IMMUNOASSAY: NORMAL
TRIGL SERPL-MCNC: 138 MG/DL (ref 0–149)
TSH SERPL DL<=0.005 MIU/L-ACNC: 1.31 UIU/ML (ref 0.38–5.33)
VIT B12 SERPL-MCNC: 216 PG/ML (ref 211–911)
WBC # BLD AUTO: 4.6 K/UL (ref 4.8–10.8)

## 2021-05-03 PROCEDURE — 80053 COMPREHEN METABOLIC PANEL: CPT

## 2021-05-03 PROCEDURE — 82607 VITAMIN B-12: CPT

## 2021-05-03 PROCEDURE — 86780 TREPONEMA PALLIDUM: CPT

## 2021-05-03 PROCEDURE — 84153 ASSAY OF PSA TOTAL: CPT

## 2021-05-03 PROCEDURE — 82306 VITAMIN D 25 HYDROXY: CPT

## 2021-05-03 PROCEDURE — 36415 COLL VENOUS BLD VENIPUNCTURE: CPT

## 2021-05-03 PROCEDURE — 84443 ASSAY THYROID STIM HORMONE: CPT

## 2021-05-03 PROCEDURE — 85025 COMPLETE CBC W/AUTO DIFF WBC: CPT

## 2021-05-03 PROCEDURE — 80061 LIPID PANEL: CPT

## 2021-05-04 LAB — 25(OH)D3 SERPL-MCNC: 14 NG/ML (ref 30–80)

## 2021-05-17 ENCOUNTER — HOSPITAL ENCOUNTER (OUTPATIENT)
Dept: LAB | Facility: MEDICAL CENTER | Age: 66
End: 2021-05-17
Attending: FAMILY MEDICINE
Payer: COMMERCIAL

## 2021-05-17 LAB
EST. AVERAGE GLUCOSE BLD GHB EST-MCNC: 126 MG/DL
HBA1C MFR BLD: 6 % (ref 4–5.6)

## 2021-05-17 PROCEDURE — 83036 HEMOGLOBIN GLYCOSYLATED A1C: CPT

## 2021-05-17 PROCEDURE — 36415 COLL VENOUS BLD VENIPUNCTURE: CPT

## 2022-05-22 ENCOUNTER — HOSPITAL ENCOUNTER (EMERGENCY)
Facility: MEDICAL CENTER | Age: 67
End: 2022-05-22
Attending: EMERGENCY MEDICINE
Payer: MEDICARE

## 2022-05-22 ENCOUNTER — APPOINTMENT (OUTPATIENT)
Dept: RADIOLOGY | Facility: MEDICAL CENTER | Age: 67
End: 2022-05-22
Attending: EMERGENCY MEDICINE
Payer: MEDICARE

## 2022-05-22 VITALS
OXYGEN SATURATION: 98 % | HEART RATE: 65 BPM | TEMPERATURE: 98 F | BODY MASS INDEX: 33.93 KG/M2 | DIASTOLIC BLOOD PRESSURE: 87 MMHG | RESPIRATION RATE: 18 BRPM | HEIGHT: 69 IN | SYSTOLIC BLOOD PRESSURE: 160 MMHG | WEIGHT: 229.06 LBS

## 2022-05-22 DIAGNOSIS — S00.03XA CONTUSION OF SCALP, INITIAL ENCOUNTER: ICD-10-CM

## 2022-05-22 DIAGNOSIS — S09.90XA CLOSED HEAD INJURY, INITIAL ENCOUNTER: ICD-10-CM

## 2022-05-22 PROCEDURE — 70450 CT HEAD/BRAIN W/O DYE: CPT | Mod: ME

## 2022-05-22 PROCEDURE — 99284 EMERGENCY DEPT VISIT MOD MDM: CPT

## 2022-05-22 PROCEDURE — 70486 CT MAXILLOFACIAL W/O DYE: CPT | Mod: ME

## 2022-05-22 PROCEDURE — 700102 HCHG RX REV CODE 250 W/ 637 OVERRIDE(OP): Performed by: EMERGENCY MEDICINE

## 2022-05-22 PROCEDURE — 72125 CT NECK SPINE W/O DYE: CPT | Mod: ME

## 2022-05-22 PROCEDURE — A9270 NON-COVERED ITEM OR SERVICE: HCPCS | Performed by: EMERGENCY MEDICINE

## 2022-05-22 RX ORDER — ACETAMINOPHEN 325 MG/1
975 TABLET ORAL ONCE
Status: COMPLETED | OUTPATIENT
Start: 2022-05-22 | End: 2022-05-22

## 2022-05-22 RX ADMIN — ACETAMINOPHEN 975 MG: 325 TABLET, FILM COATED ORAL at 13:49

## 2022-05-22 ASSESSMENT — FIBROSIS 4 INDEX: FIB4 SCORE: 1.66

## 2022-05-22 NOTE — ED PROVIDER NOTES
"ED Provider Note    CHIEF COMPLAINT  Chief Complaint   Patient presents with   • Fall     Reports \"I was trimming a tree, and I fell off a planter onto the concrete patio from about 3 feet up\". Presents with abrasion and hematoma to R forehead and abrasion bridge of nose. Denies LOC or blood thinner use. Denies c-spine tenderness to palpation. Reports \"just a massive headache\". Reports also L hip pain. Ambulates in triage with steady gait.         HPI  Terrell White is a 66 y.o. male who presents stating that he was trimming a tree and fell off of a planter onto the concrete patio from about 3 feet high.  He hit his head and is not sure if he had loss of consciousness.  He is stating that he has a severe headache.  He denies taking any blood thinners.  He denies any focal neurologic deficits.  He also states that he has sore left lower mandible and tooth where he must have smashed his teeth together when he fell.  He denies malocclusion of the teeth however.    REVIEW OF SYSTEMS  See HPI for further details. All other systems are negative.     PAST MEDICAL HISTORY   has a past medical history of Arthritis (12/17/2019), Back pain, Cataract, Chickenpox, Heart burn (12/17/2019), Hepatitis B, Influenza, Sleep apnea (12/17/2019), Snoring, TMJ (dislocation of temporomandibular joint), and Tonsillitis.    SOCIAL HISTORY  Social History     Tobacco Use   • Smoking status: Current Every Day Smoker     Packs/day: 0.25     Years: 21.00     Pack years: 5.25     Types: Cigarettes   • Smokeless tobacco: Never Used   Vaping Use   • Vaping Use: Never used   Substance and Sexual Activity   • Alcohol use: Yes     Alcohol/week: 0.0 - 3.0 oz     Comment: 6 per week   • Drug use: No   • Sexual activity: Not on file       SURGICAL HISTORY   has a past surgical history that includes arthroscopy, knee; shoulder arthroplasty total; ear reconstruction; carpal tunnel release; knee arthroplasty total (Left, 2013); cataract extraction " "with iol (Bilateral, 2016); and total hip arthroplasty (Right, 12/19/2019).    CURRENT MEDICATIONS  Home Medications     Reviewed by Alejo Maher (Pharmacy Tech) on 05/22/22 at 1402  Med List Status: Complete   Medication Last Dose Status   Cholecalciferol (D3 PO) 5/21/2022 Active   Naproxen Sodium 220 MG Cap 5/21/2022 Active                ALLERGIES  Allergies   Allergen Reactions   • Ibuprofen Hives and Itching     Pt can take aleve   • Codeine      itching   • Latex      Rash and redness from a latex dressing   • Tape      rash       FAMILY HISTORY  No pertinent family history    PHYSICAL EXAM  VITAL SIGNS: BP (!) 160/87   Pulse 65   Temp 36.4 °C (97.5 °F) (Temporal)   Resp 18   Ht 1.753 m (5' 9\")   Wt 104 kg (229 lb 0.9 oz)   SpO2 98%   BMI 33.83 kg/m²  @LYDIA[[id number]::@   Pulse ox interpretation: I interpret this pulse ox as normal.  Constitutional: Alert.  HENT: Contusion to forehead.  Pain with palpation of the left mandible.  Eyes: Pupils are equal and reactive, Conjunctiva normal, Non-icteric.   Neck: Normal range of motion, No tenderness, Supple, No stridor.   Lymphatic: No lymphadenopathy noted.   Cardiovascular: Regular rate and rhythm, no murmurs.   Thorax & Lungs: Normal breath sounds, No respiratory distress, No wheezing, No chest tenderness.   Abdomen: Bowel sounds normal, Soft, No tenderness, No masses, No pulsatile masses. No peritoneal signs.  Skin: Warm, Dry, No erythema, No rash.   Back: No bony tenderness, No CVA tenderness.   Extremities: Intact distal pulses, No edema, No tenderness, No cyanosis.  Musculoskeletal: Good range of motion in all major joints. No tenderness to palpation or major deformities noted.   Neurologic: Alert , Normal motor function, Normal sensory function, No focal deficits noted.   Psychiatric: Affect normal, Judgment normal, Mood normal.       DIAGNOSTIC STUDIES / PROCEDURES        RADIOLOGY  CT-HEAD W/O   Final Result         1. No acute intracranial " abnormality. No evidence of acute intracranial hemorrhage or mass lesion.      2. Scalp hematoma in the right forehead.               CT-CSPINE WITHOUT PLUS RECONS   Final Result         1. No acute fracture from C1 through T1 is visualized.         CT-MAXILLOFACIAL W/O PLUS RECONS   Final Result         No acute maxillofacial fracture.              COURSE & MEDICAL DECISION MAKING  Pertinent Labs & Imaging studies reviewed. (See chart for details)    The patient fell 3 feet hitting his head.  It is not clear whether he had loss of consciousness.  He was alone.  Differential diagnosis includes intracranial hemorrhage, C-spine fracture, facial fracture.    CT scans were ordered to evaluate.  The patient was given Tylenol 975 mg p.o.    The patient's CT scan head shows scalp hematoma but no evidence of intracranial hemorrhage, CT scan of the mandible shows no mandible fracture.  CT scan of the C-spine is negative.    The patient feels improved after Tylenol.  He will take Tylenol at home, he is allergic to ibuprofen.  He will return for worsening symptoms and follow-up with his doctor as needed.  He will avoid activities that can cause him to have a head injury for 1 week after symptoms resolved.    The patient will return for new or worsening symptoms and is stable at the time of discharge.    The patient is referred to a primary physician for blood pressure management, diabetic screening, and for all other preventative health concerns.        DISPOSITION:  Patient will be discharged home in stable condition.    FOLLOW UP:  [facility], Emergency Dept  [address] [facility]  [place] [id number] [phone]  Follow up  If symptoms worsen    Tony JOHNSON M.D.  [address] [facility] [address] [facility]  [place] [id number] [phone]    Follow up  As needed      OUTPATIENT MEDICATIONS:  New Prescriptions    No medications on file         The patient will return for worsening symptoms and is stable at the  time of discharge. The patient verbalizes understanding and will comply.    FINAL IMPRESSION  1. Closed head injury, initial encounter     2. Contusion of scalp, initial encounter                Electronically signed by: Bret Arceo M.D., 5/22/2022 2:06 PM

## 2022-05-22 NOTE — DISCHARGE INSTRUCTIONS
Head Injury, Adult  There are many types of head injuries. Head injuries can be as minor as a bump, or they can be a serious medical issue. More severe head injuries include:  A jarring injury to the brain (concussion).  A bruise (contusion) of the brain. This means there is bleeding in the brain that can cause swelling.  A cracked skull (skull fracture).  Bleeding in the brain that collects, clots, and forms a bump (hematoma).  After a head injury, most problems occur within the first 24 hours, but side effects may occur up to 7-10 days after the injury. It is important to watch your condition for any changes. You may need to be observed in the emergency department or urgent care, or you may be admitted to the hospital.  What are the causes?  There are many possible causes of a head injury. A serious head injury may be caused by a car accident, bicycle or motorcycle accidents, sports injuries, and falls.  What are the symptoms?  Symptoms of a head injury include a contusion, bump, or bleeding at the site of the injury. Other physical symptoms may include:  Headache.  Nausea or vomiting.  Dizziness.  Feeling tired.  Being uncomfortable around bright lights or loud noises.  Seizures.  Trouble being awakened.  Fainting.  Mental or emotional symptoms may include:  Irritability.  Confusion and memory problems.  Poor attention and concentration.  Changes in eating or sleeping habits.  Anxiety or depression.  How is this diagnosed?  This condition can usually be diagnosed based on your symptoms, a description of the injury, and a physical exam. You may also have imaging tests done, such as a CT scan or MRI.  How is this treated?  Treatment for this condition depends on the severity and type of injury you have. The main goal of treatment is to prevent complications and to allow the brain time to heal.  Mild head injury  If you have a mild head injury, you may be sent home and treatment may include:  Observation. A  responsible adult should stay with you for 24 hours after your injury and check on you often.  Physical rest.  Brain rest.  Pain medicines.  Severe head injury  If you have a severe head injury, treatment may include:  Close observation. This includes hospitalization with frequent physical exams.  Medicines to relieve pain, prevent seizures, and decrease brain swelling.  Breathing support. This may include using a ventilator.  Treatments to manage the swelling inside the brain.  Brain surgery. This may be needed to:  Remove a blood clot.  Stop the bleeding.  Remove a part of the skull to allow room for the brain to swell.  Follow these instructions at home:  Activity  Rest and avoid activities that are physically hard or tiring.  Make sure you get enough sleep.  Limit activities that require a lot of thought or attention, such as:  Watching TV.  Playing memory games and puzzles.  Job-related work or homework.  Working on the computer, using social media, and texting.  Avoid activities that could cause another head injury, such as playing sports, until your health care provider approves. Having another head injury, especially before the first one has healed, can be dangerous.  Ask your health care provider when it is safe for you to return to your regular activities, including work or school. Ask your health care provider for a step-by-step plan for gradually returning to activities.  Ask your health care provider when you can drive, ride a bicycle, or use heavy machinery. Your ability to react may be slower after a brain injury. Do not do these activities if you are dizzy.  Lifestyle    Do not drink alcohol until your health care provider approves. Do not use drugs. Alcohol and certain drugs may slow your recovery and can put you at risk of further injury.  If it is harder than usual to remember things, write them down.  If you are easily distracted, try to do one thing at a time.  Talk with family members or close  friends when making important decisions.  Tell your friends, family, a trusted colleague, and  about your injury, symptoms, and restrictions. Have them watch for any new or worsening problems.  General instructions  Take over-the-counter and prescription medicines only as told by your health care provider.  Have someone stay with you for 24 hours after your head injury. This person should watch you for any changes in your symptoms and be ready to seek medical help.  Keep all follow-up visits as told by your health care provider. This is important.  How is this prevented?  Work on improving your balance and strength to avoid falls.  Wear a seatbelt when you are in a moving vehicle.  Wear a helmet when riding a bicycle, skiing, or doing any other sport or activity that has a risk of injury.  If you drink alcohol:  Limit how much you use to:  0-1 drink a day for women.  0-2 drinks a day for men.  Be aware of how much alcohol is in your drink. In the [place] one drink equals one 12 oz bottle of beer (355 mL), one 5 oz glass of wine (148 mL), or one 1½ oz glass of hard liquor (44 mL).  Take safety measures in your home, such as:  Removing clutter and tripping hazards from floors and stairways.  Using grab bars in bathrooms and handrails by stairs.  Placing non-slip mats on floors and in bathtubs.  Improving lighting in dim areas.  Get help right away if:  You have:  A severe headache that is not helped by medicine.  Trouble walking or weakness in your arms and legs.  Clear or bloody fluid coming from your nose or ears.  Changes in your vision.  A seizure.  You lose your balance.  You vomit.  Your pupils change size.  Your speech is slurred.  Your dizziness gets worse.  You faint.  You are sleepier than normal and have trouble staying awake.  Your symptoms get worse.  These symptoms may represent a serious problem that is an emergency. Do not wait to see if the symptoms will go away. Get medical help right away.  Call your local emergency services (911 in the [place] Do not drive yourself to the hospital.  Summary  Head injuries can be minor or they can be a serious medical issue requiring immediate attention.  Treatment for this condition depends on the severity and type of injury you have.  Ask your health care provider when it is safe for you to return to your regular activities, including work or school.  Head injury prevention includes wearing a seat belt in a motor vehicle, using a helmet on a bicycle, limiting alcohol use, and taking safety measures in your home.  This information is not intended to replace advice given to you by your health care provider. Make sure you discuss any questions you have with your health care provider.  Document Released: 12/18/2006 Document Revised: 01/15/2020 Document Reviewed: 01/10/2020  Elsevier Patient Education © 2020 [employer]

## 2022-05-22 NOTE — ED NOTES
Med rec updated and complete, per pt   Allergies reviewed, per pt   Pt reports no prescription medications.  Pt reports no antibiotics in the last 30 days.

## 2022-11-10 ENCOUNTER — PATIENT MESSAGE (OUTPATIENT)
Dept: HEALTH INFORMATION MANAGEMENT | Facility: OTHER | Age: 67
End: 2022-11-10

## 2023-03-14 ENCOUNTER — HOSPITAL ENCOUNTER (OUTPATIENT)
Dept: RADIOLOGY | Facility: MEDICAL CENTER | Age: 68
End: 2023-03-14
Attending: FAMILY MEDICINE
Payer: MEDICARE

## 2023-03-14 DIAGNOSIS — M25.511 RIGHT SHOULDER PAIN, UNSPECIFIED CHRONICITY: ICD-10-CM

## 2023-03-14 PROCEDURE — 73030 X-RAY EXAM OF SHOULDER: CPT | Mod: RT

## 2023-04-25 ENCOUNTER — HOSPITAL ENCOUNTER (OUTPATIENT)
Dept: LAB | Facility: MEDICAL CENTER | Age: 68
End: 2023-04-25
Attending: FAMILY MEDICINE
Payer: MEDICARE

## 2023-04-25 ENCOUNTER — HOSPITAL ENCOUNTER (OUTPATIENT)
Dept: RADIOLOGY | Facility: MEDICAL CENTER | Age: 68
End: 2023-04-25
Attending: FAMILY MEDICINE
Payer: MEDICARE

## 2023-04-25 DIAGNOSIS — M25.551 RIGHT HIP PAIN: ICD-10-CM

## 2023-04-25 DIAGNOSIS — M25.552 LEFT HIP PAIN: ICD-10-CM

## 2023-04-25 LAB
ALBUMIN SERPL BCP-MCNC: 4.6 G/DL (ref 3.2–4.9)
ALBUMIN/GLOB SERPL: 1.5 G/DL
ALP SERPL-CCNC: 86 U/L (ref 30–99)
ALT SERPL-CCNC: 18 U/L (ref 2–50)
ANION GAP SERPL CALC-SCNC: 12 MMOL/L (ref 7–16)
AST SERPL-CCNC: 18 U/L (ref 12–45)
BASOPHILS # BLD AUTO: 1 % (ref 0–1.8)
BASOPHILS # BLD: 0.1 K/UL (ref 0–0.12)
BILIRUB SERPL-MCNC: 0.4 MG/DL (ref 0.1–1.5)
BUN SERPL-MCNC: 22 MG/DL (ref 8–22)
CALCIUM ALBUM COR SERPL-MCNC: 9.2 MG/DL (ref 8.5–10.5)
CALCIUM SERPL-MCNC: 9.7 MG/DL (ref 8.4–10.2)
CHLORIDE SERPL-SCNC: 103 MMOL/L (ref 96–112)
CK SERPL-CCNC: 137 U/L (ref 0–154)
CO2 SERPL-SCNC: 27 MMOL/L (ref 20–33)
CREAT SERPL-MCNC: 0.8 MG/DL (ref 0.5–1.4)
CRP SERPL HS-MCNC: 0.54 MG/DL (ref 0–0.75)
EOSINOPHIL # BLD AUTO: 0.15 K/UL (ref 0–0.51)
EOSINOPHIL NFR BLD: 1.5 % (ref 0–6.9)
ERYTHROCYTE [DISTWIDTH] IN BLOOD BY AUTOMATED COUNT: 41.1 FL (ref 35.9–50)
ERYTHROCYTE [SEDIMENTATION RATE] IN BLOOD BY WESTERGREN METHOD: 10 MM/HOUR (ref 0–20)
GFR SERPLBLD CREATININE-BSD FMLA CKD-EPI: 97 ML/MIN/1.73 M 2
GLOBULIN SER CALC-MCNC: 3.1 G/DL (ref 1.9–3.5)
GLUCOSE SERPL-MCNC: 128 MG/DL (ref 65–99)
HCT VFR BLD AUTO: 44 % (ref 42–52)
HGB BLD-MCNC: 15 G/DL (ref 14–18)
IMM GRANULOCYTES # BLD AUTO: 0.03 K/UL (ref 0–0.11)
IMM GRANULOCYTES NFR BLD AUTO: 0.3 % (ref 0–0.9)
LYMPHOCYTES # BLD AUTO: 2.08 K/UL (ref 1–4.8)
LYMPHOCYTES NFR BLD: 21 % (ref 22–41)
MCH RBC QN AUTO: 31.7 PG (ref 27–33)
MCHC RBC AUTO-ENTMCNC: 34.1 G/DL (ref 33.7–35.3)
MCV RBC AUTO: 93 FL (ref 81.4–97.8)
MONOCYTES # BLD AUTO: 0.81 K/UL (ref 0–0.85)
MONOCYTES NFR BLD AUTO: 8.2 % (ref 0–13.4)
NEUTROPHILS # BLD AUTO: 6.73 K/UL (ref 1.82–7.42)
NEUTROPHILS NFR BLD: 68 % (ref 44–72)
NRBC # BLD AUTO: 0 K/UL
NRBC BLD-RTO: 0 /100 WBC
PLATELET # BLD AUTO: 230 K/UL (ref 164–446)
PMV BLD AUTO: 10.5 FL (ref 9–12.9)
POTASSIUM SERPL-SCNC: 4 MMOL/L (ref 3.6–5.5)
PROT SERPL-MCNC: 7.7 G/DL (ref 6–8.2)
RBC # BLD AUTO: 4.73 M/UL (ref 4.7–6.1)
SODIUM SERPL-SCNC: 142 MMOL/L (ref 135–145)
WBC # BLD AUTO: 9.9 K/UL (ref 4.8–10.8)

## 2023-04-25 PROCEDURE — 80053 COMPREHEN METABOLIC PANEL: CPT

## 2023-04-25 PROCEDURE — 82550 ASSAY OF CK (CPK): CPT

## 2023-04-25 PROCEDURE — 73522 X-RAY EXAM HIPS BI 3-4 VIEWS: CPT

## 2023-04-25 PROCEDURE — 85652 RBC SED RATE AUTOMATED: CPT

## 2023-04-25 PROCEDURE — 86140 C-REACTIVE PROTEIN: CPT

## 2023-04-25 PROCEDURE — 36415 COLL VENOUS BLD VENIPUNCTURE: CPT

## 2023-04-25 PROCEDURE — 85025 COMPLETE CBC W/AUTO DIFF WBC: CPT

## 2023-05-17 PROBLEM — M16.12 OSTEOARTHRITIS OF LEFT HIP: Status: ACTIVE | Noted: 2023-05-17

## 2023-07-26 ENCOUNTER — HOSPITAL ENCOUNTER (OUTPATIENT)
Dept: LAB | Facility: MEDICAL CENTER | Age: 68
End: 2023-07-26
Attending: FAMILY MEDICINE
Payer: MEDICARE

## 2023-07-26 LAB
ALBUMIN SERPL BCP-MCNC: 3.8 G/DL (ref 3.2–4.9)
ALBUMIN/GLOB SERPL: 1 G/DL
ALP SERPL-CCNC: 77 U/L (ref 30–99)
ALT SERPL-CCNC: 6 U/L (ref 2–50)
ANION GAP SERPL CALC-SCNC: 11 MMOL/L (ref 7–16)
AST SERPL-CCNC: 10 U/L (ref 12–45)
BASOPHILS # BLD AUTO: 0.7 % (ref 0–1.8)
BASOPHILS # BLD: 0.04 K/UL (ref 0–0.12)
BILIRUB SERPL-MCNC: 0.5 MG/DL (ref 0.1–1.5)
BUN SERPL-MCNC: 10 MG/DL (ref 8–22)
CALCIUM ALBUM COR SERPL-MCNC: 9.7 MG/DL (ref 8.5–10.5)
CALCIUM SERPL-MCNC: 9.5 MG/DL (ref 8.4–10.2)
CHLORIDE SERPL-SCNC: 101 MMOL/L (ref 96–112)
CHOLEST SERPL-MCNC: 111 MG/DL (ref 100–199)
CO2 SERPL-SCNC: 23 MMOL/L (ref 20–33)
CREAT SERPL-MCNC: 0.68 MG/DL (ref 0.5–1.4)
CRP SERPL HS-MCNC: 9.65 MG/DL (ref 0–0.75)
EOSINOPHIL # BLD AUTO: 0.14 K/UL (ref 0–0.51)
EOSINOPHIL NFR BLD: 2.3 % (ref 0–6.9)
ERYTHROCYTE [DISTWIDTH] IN BLOOD BY AUTOMATED COUNT: 47.9 FL (ref 35.9–50)
ERYTHROCYTE [SEDIMENTATION RATE] IN BLOOD BY WESTERGREN METHOD: 106 MM/HOUR (ref 0–20)
FASTING STATUS PATIENT QL REPORTED: NORMAL
GFR SERPLBLD CREATININE-BSD FMLA CKD-EPI: 102 ML/MIN/1.73 M 2
GLOBULIN SER CALC-MCNC: 4 G/DL (ref 1.9–3.5)
GLUCOSE SERPL-MCNC: 121 MG/DL (ref 65–99)
HCT VFR BLD AUTO: 34.4 % (ref 42–52)
HDLC SERPL-MCNC: 42 MG/DL
HGB BLD-MCNC: 11.2 G/DL (ref 14–18)
IMM GRANULOCYTES # BLD AUTO: 0.02 K/UL (ref 0–0.11)
IMM GRANULOCYTES NFR BLD AUTO: 0.3 % (ref 0–0.9)
LDLC SERPL CALC-MCNC: 49 MG/DL
LYMPHOCYTES # BLD AUTO: 1.37 K/UL (ref 1–4.8)
LYMPHOCYTES NFR BLD: 22.8 % (ref 22–41)
MCH RBC QN AUTO: 31.1 PG (ref 27–33)
MCHC RBC AUTO-ENTMCNC: 32.6 G/DL (ref 32.3–36.5)
MCV RBC AUTO: 95.6 FL (ref 81.4–97.8)
MONOCYTES # BLD AUTO: 0.58 K/UL (ref 0–0.85)
MONOCYTES NFR BLD AUTO: 9.7 % (ref 0–13.4)
NEUTROPHILS # BLD AUTO: 3.85 K/UL (ref 1.82–7.42)
NEUTROPHILS NFR BLD: 64.2 % (ref 44–72)
NRBC # BLD AUTO: 0 K/UL
NRBC BLD-RTO: 0 /100 WBC (ref 0–0.2)
PLATELET # BLD AUTO: 347 K/UL (ref 164–446)
PMV BLD AUTO: 9.6 FL (ref 9–12.9)
POTASSIUM SERPL-SCNC: 4 MMOL/L (ref 3.6–5.5)
PROT SERPL-MCNC: 7.8 G/DL (ref 6–8.2)
RBC # BLD AUTO: 3.6 M/UL (ref 4.7–6.1)
SODIUM SERPL-SCNC: 135 MMOL/L (ref 135–145)
TRIGL SERPL-MCNC: 102 MG/DL (ref 0–149)
WBC # BLD AUTO: 6 K/UL (ref 4.8–10.8)

## 2023-07-26 PROCEDURE — 86140 C-REACTIVE PROTEIN: CPT

## 2023-07-26 PROCEDURE — 80053 COMPREHEN METABOLIC PANEL: CPT

## 2023-07-26 PROCEDURE — 36415 COLL VENOUS BLD VENIPUNCTURE: CPT

## 2023-07-26 PROCEDURE — 85652 RBC SED RATE AUTOMATED: CPT

## 2023-07-26 PROCEDURE — 85025 COMPLETE CBC W/AUTO DIFF WBC: CPT

## 2023-07-26 PROCEDURE — 80061 LIPID PANEL: CPT

## 2023-10-19 ENCOUNTER — HOSPITAL ENCOUNTER (OUTPATIENT)
Dept: RADIOLOGY | Facility: MEDICAL CENTER | Age: 68
End: 2023-10-19
Payer: MEDICARE

## 2024-04-25 ENCOUNTER — TELEPHONE (OUTPATIENT)
Dept: PHYSICAL THERAPY | Facility: REHABILITATION | Age: 69
End: 2024-04-25
Payer: MEDICARE

## 2024-04-25 NOTE — OP THERAPY DISCHARGE SUMMARY
Outpatient Physical Therapy  DISCHARGE SUMMARY NOTE      [facility]  [address], [address]  [place] [address]  Phone:  [phone]  Fax:  [phone]    Date of Visit: 04/25/2024    Patient: Terrell White  YOB: 1955  MRN: [id number]     Referring Provider: Anuj Glaser M.D.    Referring Diagnosis Cervicalgia [M54.2]        Comments:  Terrell White has been discharged due to a lapse in care greater than 30 days. Thank you for the opportunity to assist you and your patient.      Limitations Remaining:  NA    Recommendations:  Patient will now be discharged due to a lapse in the plan of care. Administrative discharge to close this case.      Marc Basurto, PT, DPT    Date: 4/25/2024

## 2025-02-05 ENCOUNTER — HOSPITAL ENCOUNTER (OUTPATIENT)
Dept: RADIOLOGY | Facility: MEDICAL CENTER | Age: 70
End: 2025-02-05
Attending: STUDENT IN AN ORGANIZED HEALTH CARE EDUCATION/TRAINING PROGRAM
Payer: MEDICARE

## 2025-02-05 DIAGNOSIS — M54.50 LUMBAR PAIN: ICD-10-CM

## 2025-02-05 PROCEDURE — 72100 X-RAY EXAM L-S SPINE 2/3 VWS: CPT

## 2025-03-07 ENCOUNTER — HOSPITAL ENCOUNTER (OUTPATIENT)
Facility: MEDICAL CENTER | Age: 70
End: 2025-03-07
Attending: STUDENT IN AN ORGANIZED HEALTH CARE EDUCATION/TRAINING PROGRAM
Payer: MEDICARE

## 2025-03-07 LAB
CRP SERPL HS-MCNC: 0.5 MG/DL (ref 0–0.75)
ERYTHROCYTE [SEDIMENTATION RATE] IN BLOOD BY WESTERGREN METHOD: 15 MM/HOUR (ref 0–20)

## 2025-03-07 PROCEDURE — 86140 C-REACTIVE PROTEIN: CPT

## 2025-03-07 PROCEDURE — 85652 RBC SED RATE AUTOMATED: CPT

## 2025-03-07 PROCEDURE — 36415 COLL VENOUS BLD VENIPUNCTURE: CPT

## 2025-03-12 ENCOUNTER — HOSPITAL ENCOUNTER (OUTPATIENT)
Dept: RADIOLOGY | Facility: MEDICAL CENTER | Age: 70
End: 2025-03-12
Attending: STUDENT IN AN ORGANIZED HEALTH CARE EDUCATION/TRAINING PROGRAM
Payer: MEDICARE

## 2025-03-12 DIAGNOSIS — T84.84XA PAIN DUE TO INTERNAL ORTHOPEDIC PROSTHETIC DEVICES, IMPLANTS AND GRAFTS, INITIAL ENCOUNTER (HCC): ICD-10-CM

## 2025-03-12 PROCEDURE — 73700 CT LOWER EXTREMITY W/O DYE: CPT | Mod: LT

## 2025-03-18 ENCOUNTER — HOSPITAL ENCOUNTER (OUTPATIENT)
Facility: MEDICAL CENTER | Age: 70
End: 2025-03-18
Attending: STUDENT IN AN ORGANIZED HEALTH CARE EDUCATION/TRAINING PROGRAM | Admitting: STUDENT IN AN ORGANIZED HEALTH CARE EDUCATION/TRAINING PROGRAM
Payer: MEDICARE

## 2025-03-24 ENCOUNTER — HOSPITAL ENCOUNTER (OUTPATIENT)
Dept: RADIOLOGY | Facility: MEDICAL CENTER | Age: 70
End: 2025-03-24
Attending: STUDENT IN AN ORGANIZED HEALTH CARE EDUCATION/TRAINING PROGRAM
Payer: MEDICARE

## 2025-03-24 DIAGNOSIS — Z96.642 PRESENCE OF LEFT ARTIFICIAL HIP JOINT: ICD-10-CM

## 2025-03-24 PROCEDURE — 20610 DRAIN/INJ JOINT/BURSA W/O US: CPT | Mod: LT

## 2025-03-26 ENCOUNTER — APPOINTMENT (OUTPATIENT)
Dept: ADMISSIONS | Facility: MEDICAL CENTER | Age: 70
End: 2025-03-26
Attending: STUDENT IN AN ORGANIZED HEALTH CARE EDUCATION/TRAINING PROGRAM
Payer: MEDICARE

## 2025-04-04 ENCOUNTER — APPOINTMENT (OUTPATIENT)
Dept: ADMISSIONS | Facility: MEDICAL CENTER | Age: 70
End: 2025-04-04
Attending: STUDENT IN AN ORGANIZED HEALTH CARE EDUCATION/TRAINING PROGRAM
Payer: MEDICARE

## 2025-05-19 ENCOUNTER — HOSPITAL ENCOUNTER (OUTPATIENT)
Facility: MEDICAL CENTER | Age: 70
End: 2025-05-19
Attending: STUDENT IN AN ORGANIZED HEALTH CARE EDUCATION/TRAINING PROGRAM
Payer: MEDICARE

## 2025-05-19 LAB
BASOPHILS # BLD AUTO: 3.5 % (ref 0–1.8)
BASOPHILS # BLD: 0.28 K/UL (ref 0–0.12)
EOSINOPHIL # BLD AUTO: 2.7 K/UL (ref 0–0.51)
EOSINOPHIL NFR BLD: 33.3 % (ref 0–6.9)
ERYTHROCYTE [DISTWIDTH] IN BLOOD BY AUTOMATED COUNT: 49.2 FL (ref 35.9–50)
HCT VFR BLD AUTO: 32.9 % (ref 42–52)
HGB BLD-MCNC: 10.6 G/DL (ref 14–18)
LYMPHOCYTES # BLD AUTO: 1.21 K/UL (ref 1–4.8)
LYMPHOCYTES NFR BLD: 14.9 % (ref 22–41)
MANUAL DIFF BLD: NORMAL
MCH RBC QN AUTO: 30.8 PG (ref 27–33)
MCHC RBC AUTO-ENTMCNC: 32.2 G/DL (ref 32.3–36.5)
MCV RBC AUTO: 95.6 FL (ref 81.4–97.8)
MONOCYTES # BLD AUTO: 0.4 K/UL (ref 0–0.85)
MONOCYTES NFR BLD AUTO: 4.4 % (ref 0–13.4)
MORPHOLOGY BLD-IMP: NORMAL
MYELOCYTES NFR BLD MANUAL: 0.9 %
NEUTROPHILS # BLD AUTO: 3.48 K/UL (ref 1.82–7.42)
NEUTROPHILS NFR BLD: 43 % (ref 44–72)
NRBC # BLD AUTO: 0 K/UL
NRBC BLD-RTO: 0 /100 WBC (ref 0–0.2)
PLATELET # BLD AUTO: 329 K/UL (ref 164–446)
PLATELET BLD QL SMEAR: NORMAL
PMV BLD AUTO: 10.9 FL (ref 9–12.9)
RBC # BLD AUTO: 3.44 M/UL (ref 4.7–6.1)
RBC BLD AUTO: NORMAL
WBC # BLD AUTO: 8.1 K/UL (ref 4.8–10.8)

## 2025-05-19 PROCEDURE — 85007 BL SMEAR W/DIFF WBC COUNT: CPT

## 2025-05-19 PROCEDURE — 85027 COMPLETE CBC AUTOMATED: CPT

## 2025-05-19 PROCEDURE — 80053 COMPREHEN METABOLIC PANEL: CPT

## 2025-05-19 PROCEDURE — 82550 ASSAY OF CK (CPK): CPT

## 2025-05-20 ENCOUNTER — HOSPITAL ENCOUNTER (EMERGENCY)
Facility: MEDICAL CENTER | Age: 70
End: 2025-05-20
Attending: EMERGENCY MEDICINE
Payer: MEDICARE

## 2025-05-20 VITALS
DIASTOLIC BLOOD PRESSURE: 79 MMHG | RESPIRATION RATE: 16 BRPM | HEART RATE: 78 BPM | WEIGHT: 223.99 LBS | HEIGHT: 69 IN | OXYGEN SATURATION: 97 % | TEMPERATURE: 98.9 F | BODY MASS INDEX: 33.18 KG/M2 | SYSTOLIC BLOOD PRESSURE: 143 MMHG

## 2025-05-20 DIAGNOSIS — L29.9 ITCHING: ICD-10-CM

## 2025-05-20 DIAGNOSIS — R23.8 SKIN IRRITATION: Primary | ICD-10-CM

## 2025-05-20 LAB
ALBUMIN SERPL BCP-MCNC: 4 G/DL (ref 3.2–4.9)
ALBUMIN/GLOB SERPL: 1.2 G/DL
ALP SERPL-CCNC: 96 U/L (ref 30–99)
ALT SERPL-CCNC: 28 U/L (ref 2–50)
ANION GAP SERPL CALC-SCNC: 12 MMOL/L (ref 7–16)
AST SERPL-CCNC: 36 U/L (ref 12–45)
BILIRUB SERPL-MCNC: 0.2 MG/DL (ref 0.1–1.5)
BUN SERPL-MCNC: 19 MG/DL (ref 8–22)
CALCIUM ALBUM COR SERPL-MCNC: 8.7 MG/DL (ref 8.5–10.5)
CALCIUM SERPL-MCNC: 8.7 MG/DL (ref 8.5–10.5)
CHLORIDE SERPL-SCNC: 108 MMOL/L (ref 96–112)
CK SERPL-CCNC: 603 U/L (ref 0–154)
CO2 SERPL-SCNC: 17 MMOL/L (ref 20–33)
CREAT SERPL-MCNC: 0.77 MG/DL (ref 0.5–1.4)
GFR SERPLBLD CREATININE-BSD FMLA CKD-EPI: 97 ML/MIN/1.73 M 2
GLOBULIN SER CALC-MCNC: 3.3 G/DL (ref 1.9–3.5)
GLUCOSE SERPL-MCNC: 101 MG/DL (ref 65–99)
POTASSIUM SERPL-SCNC: 4.1 MMOL/L (ref 3.6–5.5)
PROT SERPL-MCNC: 7.3 G/DL (ref 6–8.2)
SODIUM SERPL-SCNC: 137 MMOL/L (ref 135–145)

## 2025-05-20 PROCEDURE — 700102 HCHG RX REV CODE 250 W/ 637 OVERRIDE(OP): Performed by: EMERGENCY MEDICINE

## 2025-05-20 PROCEDURE — 99284 EMERGENCY DEPT VISIT MOD MDM: CPT

## 2025-05-20 PROCEDURE — A9270 NON-COVERED ITEM OR SERVICE: HCPCS | Performed by: EMERGENCY MEDICINE

## 2025-05-20 RX ORDER — HYDROXYZINE HYDROCHLORIDE 25 MG/1
25 TABLET, FILM COATED ORAL ONCE
Status: COMPLETED | OUTPATIENT
Start: 2025-05-20 | End: 2025-05-20

## 2025-05-20 RX ORDER — HYDROXYZINE HYDROCHLORIDE 25 MG/1
25 TABLET, FILM COATED ORAL 3 TIMES DAILY PRN
Qty: 9 TABLET | Refills: 0 | Status: SHIPPED | OUTPATIENT
Start: 2025-05-20 | End: 2025-05-23

## 2025-05-20 RX ADMIN — HYDROXYZINE HYDROCHLORIDE 25 MG: 25 TABLET, FILM COATED ORAL at 16:25

## 2025-05-20 ASSESSMENT — FIBROSIS 4 INDEX: FIB4 SCORE: 1.41

## 2025-05-20 NOTE — ED TRIAGE NOTES
"Patient presents to the ER with the following complaints:    Chief Complaint   Patient presents with    Itching     At RUQ PICC Site. Mild swelling present above insertion point paired with potential to minimal swelling.        BP (!) 151/81   Pulse 80   Temp 37.2 °C (98.9 °F) (Temporal)   Resp 18   Ht 1.753 m (5' 9\")   Wt 102 kg (223 lb 15.8 oz)   SpO2 95%   BMI 33.08 kg/m²       "

## 2025-05-20 NOTE — ED NOTES
ERP at bedside. Pt agrees with plan of care discussed by ERP. Narda in low position, side rail up for pt safety. Call light within reach. Plan of care on-going

## 2025-05-20 NOTE — DISCHARGE INSTRUCTIONS
We have changed your dressing, cleaning the skin with alcohol instead of chlorhexidine.  You may have a little bit of continued itching.  Use the prescribed medication to help.  Do not combine this prescribed Atarax with Benadryl or Advil PM.  They are similar, and so should not be used together.

## 2025-05-20 NOTE — ED PROVIDER NOTES
ER Provider Note    Scribed for Kalin Grossman M.d. by Rosas Dietrich. 5/20/2025  3:57 PM    Primary Care Provider: Tony JOHNSON M.D.    CHIEF COMPLAINT  Chief Complaint   Patient presents with    Itching     At RUQ PICC Site. Mild swelling present above insertion point paired with potential to minimal swelling.      EXTERNAL RECORDS REVIEWED  Outpatient records show ongoing home antibiotic infusion for infected orthopedic hardware.    HPI/ROS  LIMITATION TO HISTORY   Select: : None    OUTSIDE HISTORIAN(S):  None    Terrell White is a 69 y.o. male who presents to the ED for evaluation of right upper extremity skin irritation onset yesterday. The patient reports having a PICC line in place since May 6th. Yesterday, the dressing was changed using Chlorhexidine and it has since started itching. The patient attempted to treat his symptoms with Benadryl with mild alleviation. Patient is allergic to latex, codeine, and tape.     PAST MEDICAL HISTORY  Past Medical History[1]    SURGICAL HISTORY  Past Surgical History[2]    FAMILY HISTORY  Family History   Problem Relation Age of Onset    No Known Problems Mother     Diabetes Father     No Known Problems Sister     No Known Problems Brother     No Known Problems Sister     No Known Problems Sister     No Known Problems Brother     No Known Problems Brother     No Known Problems Brother     No Known Problems Brother     No Known Problems Daughter     No Known Problems Son     No Known Problems Daughter     Sleep Apnea Neg Hx        SOCIAL HISTORY   reports that he has been smoking cigarettes. He has a 5.3 pack-year smoking history. He has been exposed to tobacco smoke. He has never used smokeless tobacco. He reports current alcohol use. He reports that he does not use drugs.    CURRENT MEDICATIONS  Discharge Medication List as of 5/20/2025  4:33 PM        CONTINUE these medications which have NOT CHANGED    Details   aspirin (ASPIR-LOW) 81 MG EC  "tablet Take 1 Tablet by mouth 2 times a day with meals., Disp-90 Tablet, R-0, Normal      Naproxen Sodium (ALEVE PO) Take  by mouth., Historical Med      Cholecalciferol (D3 PO) Take 1 Capsule by mouth every day., Historical Med             ALLERGIES  Ibuprofen, Codeine, Latex, and Tape    PHYSICAL EXAM  VITAL SIGNS: BP (!) 151/81   Pulse 80   Temp 37.2 °C (98.9 °F) (Temporal)   Resp 18   Ht 1.753 m (5' 9\")   Wt 102 kg (223 lb 15.8 oz)   SpO2 95%   BMI 33.08 kg/m²   Pulse ox interpretation: I interpret this pulse ox as normal.  Constitutional: Alert in no apparent distress.  HENT: No signs of trauma, Bilateral external ears normal, Nose normal.   Eyes: Conjunctiva normal, Non-icteric.   Neck: Normal range of motion, Supple, No stridor.   Lymphatic: No lymphadenopathy noted.   Cardiovascular: Regular rate and rhythm, no murmurs.   Thorax & Lungs: Normal breath sounds, No respiratory distress, No wheezing  Abdomen: Bowel sounds normal, Soft, No tenderness, No masses, No pulsatile masses. No peritoneal signs.  Skin: Warm, Dry, No erythema, No rash.   Back: No midline bony tenderness.   Extremities: Right upper extremity PICC line in place. Skin under the dressing does not show redness, swelling, drainage, fluctuance, or any sign of infection. Intact distal pulses, No edema, No cyanosis.  Musculoskeletal: Good range of motion in all major joints. No or major deformities noted.   Neurologic: Alert , Normal motor function, Normal sensory function, No focal deficits noted.   Psychiatric: Affect normal, Judgment normal, Mood normal.     COURSE & MEDICAL DECISION MAKING     INITIAL ASSESSMENT, COURSE AND PLAN  Care Narrative:     3:57 PM Patient presents to the ED with skin irritation from his PICC line dressing. Patient evaluated at bedside and discussed plan of care, including a dressing change of his PICC line. Patient will be treated with Atarax 25 mg Tablet. Patient has had itching since having his PICC line " dressing changed yesterday. He has had no irritation from May 6th, when the line was placed, until yesterday after the dressing change, strongly suggesting irritation with no signs of infection. I discussed plan for discharge and follow up as outlined below. The patient is stable for discharge at this time and will return for any new or worsening symptoms. Patient verbalizes understanding and support with my plan for discharge.       5:05 PM PIC line dressing changed, with improvement.  The report that the patient reported was not working had flow restored by nursing.  Attached pigtail that could not be removed yesterday was able to be removed by nursing, and was replaced.    ADDITIONAL PROBLEM MANAGED  In addition to itching, patient complained of 1 port on the PICC line that was not working, and an attached pigtailed that his care team tried to but could not remove yesterday.    DISPOSITION AND DISCUSSIONS  I have discussed management of the patient with the following physicians and KAYA's:  None    Discussion of management with other QHP or appropriate source(s): None     Escalation of care considered, and ultimately not performed: Laboratory analysis.  Considered, but no evidence of infection, ultimately.    Decision tools and prescription drugs considered including, but not limited to: Medication modification was performed, to prescribe 3 days of Atarax for any residual itching.    The patient will return for new or worsening symptoms and is stable at the time of discharge.    The patient is referred to a primary physician for blood pressure management, diabetic screening, and for all other preventative health concerns.    DISPOSITION:  Patient will be discharged home in stable condition.    FOLLOW UP:  Tony JOHNSON M.D.  [id number] [facility] [id number] [phone]      As needed      OUTPATIENT MEDICATIONS:  Discharge Medication List as of 5/20/2025  4:33 PM        START taking these  medications    Details   hydrOXYzine HCl (ATARAX) 25 MG Tab Take 1 Tablet by mouth 3 times a day as needed for Itching for up to 3 days., Disp-9 Tablet, R-0, Normal               FINAL DIAGNOSIS  1. Skin irritation    2. Itching          Rosas CUELLAR (Scribe), am scribing for, and in the presence of, Kalin Grossman M.D..    Electronically signed by: Rosas Dietrich (Scribe), 5/20/2025    IKalin M.D. personally performed the services described in this documentation, as scribed by Rosas Dietrich in my presence, and it is both accurate and complete.           [1]   Past Medical History:  Diagnosis Date    Arthritis 12/17/2019    osteo    Back pain     Cataract     IOL    Chickenpox     Heart burn 12/17/2019    Hepatitis B     Influenza     Sleep apnea 12/17/2019    + cpap    Snoring     TMJ (dislocation of temporomandibular joint)     Tonsillitis    [2]   Past Surgical History:  Procedure Laterality Date    ME TOTAL HIP ARTHROPLASTY Left 6/12/2023    Procedure: LEFT ANTERIOR TOTAL HIP ARTHROPLASTY;  Surgeon: Lm Hong M.D.;  Location: [facility];  Service: Orthopedics    ME TOTAL HIP ARTHROPLASTY Right 12/19/2019    Procedure: ARTHROPLASTY, HIP, TOTAL, ANTERIOR APPROACH;  Surgeon: Lm Hong M.D.;  Location: SUR[facility]ERY [facility];  Service: Orthopedics    CATARACT EXTRACTION WITH IOL Bilateral 2016    KNEE ARTHROPLASTY TOTAL Left 2013    ARTHROSCOPY, KNEE      CARPAL TUNNEL RELEASE      EAR RECONSTRUCTION      SHOULDER ARTHROPLASTY TOTAL

## 2025-05-27 ENCOUNTER — HOSPITAL ENCOUNTER (OUTPATIENT)
Facility: MEDICAL CENTER | Age: 70
End: 2025-05-27
Attending: STUDENT IN AN ORGANIZED HEALTH CARE EDUCATION/TRAINING PROGRAM
Payer: MEDICARE

## 2025-05-27 LAB
ALBUMIN SERPL BCP-MCNC: 4 G/DL (ref 3.2–4.9)
ALBUMIN/GLOB SERPL: 1.1 G/DL
ALP SERPL-CCNC: 104 U/L (ref 30–99)
ALT SERPL-CCNC: 25 U/L (ref 2–50)
ANION GAP SERPL CALC-SCNC: 14 MMOL/L (ref 7–16)
AST SERPL-CCNC: 33 U/L (ref 12–45)
BASOPHILS # BLD AUTO: 1.7 % (ref 0–1.8)
BASOPHILS # BLD: 0.14 K/UL (ref 0–0.12)
BILIRUB SERPL-MCNC: 0.3 MG/DL (ref 0.1–1.5)
BUN SERPL-MCNC: 20 MG/DL (ref 8–22)
BURR CELLS BLD QL SMEAR: NORMAL
CALCIUM ALBUM COR SERPL-MCNC: 9.4 MG/DL (ref 8.5–10.5)
CALCIUM SERPL-MCNC: 9.4 MG/DL (ref 8.5–10.5)
CHLORIDE SERPL-SCNC: 108 MMOL/L (ref 96–112)
CO2 SERPL-SCNC: 16 MMOL/L (ref 20–33)
CREAT SERPL-MCNC: 1.03 MG/DL (ref 0.5–1.4)
EOSINOPHIL # BLD AUTO: 2.89 K/UL (ref 0–0.51)
EOSINOPHIL NFR BLD: 35.3 % (ref 0–6.9)
ERYTHROCYTE [DISTWIDTH] IN BLOOD BY AUTOMATED COUNT: 49.2 FL (ref 35.9–50)
GFR SERPLBLD CREATININE-BSD FMLA CKD-EPI: 79 ML/MIN/1.73 M 2
GLOBULIN SER CALC-MCNC: 3.7 G/DL (ref 1.9–3.5)
GLUCOSE SERPL-MCNC: 104 MG/DL (ref 65–99)
HCT VFR BLD AUTO: 35.9 % (ref 42–52)
HGB BLD-MCNC: 11.5 G/DL (ref 14–18)
LYMPHOCYTES # BLD AUTO: 1.8 K/UL (ref 1–4.8)
LYMPHOCYTES NFR BLD: 21.9 % (ref 22–41)
MANUAL DIFF BLD: NORMAL
MCH RBC QN AUTO: 30.4 PG (ref 27–33)
MCHC RBC AUTO-ENTMCNC: 32 G/DL (ref 32.3–36.5)
MCV RBC AUTO: 95 FL (ref 81.4–97.8)
MONOCYTES # BLD AUTO: 0.4 K/UL (ref 0–0.85)
MONOCYTES NFR BLD AUTO: 5 % (ref 0–13.4)
MORPHOLOGY BLD-IMP: NORMAL
NEUTROPHILS # BLD AUTO: 2.96 K/UL (ref 1.82–7.42)
NEUTROPHILS NFR BLD: 36.1 % (ref 44–72)
NRBC # BLD AUTO: 0 K/UL
NRBC BLD-RTO: 0 /100 WBC (ref 0–0.2)
PLATELET # BLD AUTO: 253 K/UL (ref 164–446)
PLATELET BLD QL SMEAR: NORMAL
PMV BLD AUTO: 11.7 FL (ref 9–12.9)
POIKILOCYTOSIS BLD QL SMEAR: NORMAL
POTASSIUM SERPL-SCNC: 4.6 MMOL/L (ref 3.6–5.5)
PROT SERPL-MCNC: 7.7 G/DL (ref 6–8.2)
RBC # BLD AUTO: 3.78 M/UL (ref 4.7–6.1)
RBC BLD AUTO: PRESENT
SODIUM SERPL-SCNC: 138 MMOL/L (ref 135–145)
WBC # BLD AUTO: 8.2 K/UL (ref 4.8–10.8)

## 2025-05-27 PROCEDURE — 85027 COMPLETE CBC AUTOMATED: CPT

## 2025-05-27 PROCEDURE — 85007 BL SMEAR W/DIFF WBC COUNT: CPT

## 2025-05-27 PROCEDURE — 80053 COMPREHEN METABOLIC PANEL: CPT

## 2025-06-02 ENCOUNTER — HOSPITAL ENCOUNTER (OUTPATIENT)
Facility: MEDICAL CENTER | Age: 70
End: 2025-06-02
Attending: STUDENT IN AN ORGANIZED HEALTH CARE EDUCATION/TRAINING PROGRAM
Payer: COMMERCIAL

## 2025-06-02 LAB
ALBUMIN SERPL BCP-MCNC: 4.3 G/DL (ref 3.2–4.9)
ALBUMIN/GLOB SERPL: 1.2 G/DL
ALP SERPL-CCNC: 113 U/L (ref 30–99)
ALT SERPL-CCNC: 22 U/L (ref 2–50)
ANION GAP SERPL CALC-SCNC: 14 MMOL/L (ref 7–16)
AST SERPL-CCNC: 25 U/L (ref 12–45)
BASOPHILS # BLD AUTO: 1.5 % (ref 0–1.8)
BASOPHILS # BLD: 0.11 K/UL (ref 0–0.12)
BILIRUB SERPL-MCNC: 0.3 MG/DL (ref 0.1–1.5)
BUN SERPL-MCNC: 15 MG/DL (ref 8–22)
CALCIUM ALBUM COR SERPL-MCNC: 8.9 MG/DL (ref 8.5–10.5)
CALCIUM SERPL-MCNC: 9.1 MG/DL (ref 8.5–10.5)
CHLORIDE SERPL-SCNC: 110 MMOL/L (ref 96–112)
CK SERPL-CCNC: 150 U/L (ref 0–154)
CO2 SERPL-SCNC: 16 MMOL/L (ref 20–33)
CREAT SERPL-MCNC: 0.91 MG/DL (ref 0.5–1.4)
EOSINOPHIL # BLD AUTO: 1.21 K/UL (ref 0–0.51)
EOSINOPHIL NFR BLD: 16.6 % (ref 0–6.9)
ERYTHROCYTE [DISTWIDTH] IN BLOOD BY AUTOMATED COUNT: 47.3 FL (ref 35.9–50)
GFR SERPLBLD CREATININE-BSD FMLA CKD-EPI: 91 ML/MIN/1.73 M 2
GLOBULIN SER CALC-MCNC: 3.6 G/DL (ref 1.9–3.5)
GLUCOSE SERPL-MCNC: 116 MG/DL (ref 65–99)
HCT VFR BLD AUTO: 38.4 % (ref 42–52)
HGB BLD-MCNC: 12.4 G/DL (ref 14–18)
IMM GRANULOCYTES # BLD AUTO: 0.03 K/UL (ref 0–0.11)
IMM GRANULOCYTES NFR BLD AUTO: 0.4 % (ref 0–0.9)
LYMPHOCYTES # BLD AUTO: 2.01 K/UL (ref 1–4.8)
LYMPHOCYTES NFR BLD: 27.5 % (ref 22–41)
MCH RBC QN AUTO: 30 PG (ref 27–33)
MCHC RBC AUTO-ENTMCNC: 32.3 G/DL (ref 32.3–36.5)
MCV RBC AUTO: 92.8 FL (ref 81.4–97.8)
MONOCYTES # BLD AUTO: 0.51 K/UL (ref 0–0.85)
MONOCYTES NFR BLD AUTO: 7 % (ref 0–13.4)
NEUTROPHILS # BLD AUTO: 3.44 K/UL (ref 1.82–7.42)
NEUTROPHILS NFR BLD: 47 % (ref 44–72)
NRBC # BLD AUTO: 0 K/UL
NRBC BLD-RTO: 0 /100 WBC (ref 0–0.2)
PLATELET # BLD AUTO: 245 K/UL (ref 164–446)
PMV BLD AUTO: 11.5 FL (ref 9–12.9)
POTASSIUM SERPL-SCNC: 4.3 MMOL/L (ref 3.6–5.5)
PROT SERPL-MCNC: 7.9 G/DL (ref 6–8.2)
RBC # BLD AUTO: 4.14 M/UL (ref 4.7–6.1)
SODIUM SERPL-SCNC: 140 MMOL/L (ref 135–145)
WBC # BLD AUTO: 7.3 K/UL (ref 4.8–10.8)

## 2025-06-02 PROCEDURE — 80053 COMPREHEN METABOLIC PANEL: CPT

## 2025-06-02 PROCEDURE — 85025 COMPLETE CBC W/AUTO DIFF WBC: CPT

## 2025-06-02 PROCEDURE — 82550 ASSAY OF CK (CPK): CPT

## 2025-08-10 ENCOUNTER — APPOINTMENT (OUTPATIENT)
Dept: RADIOLOGY | Facility: MEDICAL CENTER | Age: 70
End: 2025-08-10
Attending: EMERGENCY MEDICINE
Payer: COMMERCIAL

## 2025-08-10 ENCOUNTER — HOSPITAL ENCOUNTER (EMERGENCY)
Facility: MEDICAL CENTER | Age: 70
End: 2025-08-10
Attending: EMERGENCY MEDICINE
Payer: COMMERCIAL

## 2025-08-10 VITALS
OXYGEN SATURATION: 95 % | SYSTOLIC BLOOD PRESSURE: 196 MMHG | HEART RATE: 78 BPM | TEMPERATURE: 98.4 F | BODY MASS INDEX: 33.63 KG/M2 | HEIGHT: 69 IN | DIASTOLIC BLOOD PRESSURE: 111 MMHG | WEIGHT: 227.07 LBS | RESPIRATION RATE: 18 BRPM
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
## 2025-08-10 DIAGNOSIS — S00.31XA ABRASION OF NOSE, INITIAL ENCOUNTER: ICD-10-CM

## 2025-08-10 DIAGNOSIS — S09.8XXA BLUNT HEAD TRAUMA, INITIAL ENCOUNTER: ICD-10-CM

## 2025-08-10 PROCEDURE — 70486 CT MAXILLOFACIAL W/O DYE: CPT

## 2025-08-10 PROCEDURE — 99284 EMERGENCY DEPT VISIT MOD MDM: CPT | Mod: XU

## 2025-08-10 PROCEDURE — 70450 CT HEAD/BRAIN W/O DYE: CPT

## 2025-08-10 RX ORDER — NAPROXEN 500 MG/1
500 TABLET ORAL 2 TIMES DAILY WITH MEALS
COMMUNITY

## 2025-08-10 RX ORDER — LOSARTAN POTASSIUM 100 MG/1
100 TABLET ORAL DAILY
COMMUNITY

## 2025-08-10 RX ORDER — ACETAMINOPHEN 500 MG
1000 TABLET ORAL EVERY 6 HOURS PRN
COMMUNITY

## 2025-08-10 RX ORDER — DOXYCYCLINE HYCLATE 100 MG
100 TABLET ORAL 2 TIMES DAILY
COMMUNITY

## 2025-08-10 RX ORDER — DIPHENHYDRAMINE HCL 25 MG
50 TABLET ORAL NIGHTLY
COMMUNITY

## 2025-08-10 RX ORDER — ONDANSETRON 4 MG/1
4 TABLET, ORALLY DISINTEGRATING ORAL ONCE
Status: DISCONTINUED | OUTPATIENT
Start: 2025-08-10 | End: 2025-08-10

## 2025-08-10 RX ORDER — ACETAMINOPHEN 500 MG
500 TABLET ORAL ONCE
Status: DISCONTINUED | OUTPATIENT
Start: 2025-08-10 | End: 2025-08-10

## 2025-08-10 RX ORDER — HYDROMORPHONE HYDROCHLORIDE 2 MG/ML
2 INJECTION, SOLUTION INTRAMUSCULAR; INTRAVENOUS; SUBCUTANEOUS ONCE
Status: DISCONTINUED | OUTPATIENT
Start: 2025-08-10 | End: 2025-08-10

## 2025-08-10 RX ORDER — GABAPENTIN 300 MG/1
300 CAPSULE ORAL 2 TIMES DAILY
COMMUNITY

## 2025-08-10 RX ORDER — ACETAMINOPHEN 500 MG
1000 TABLET ORAL ONCE
Status: DISCONTINUED | OUTPATIENT
Start: 2025-08-10 | End: 2025-08-10

## 2025-08-10 ASSESSMENT — FIBROSIS 4 INDEX: FIB4 SCORE: 1.48

## (undated) DEVICE — SOLUTION 10%PVP-IODINE 8OZ - (24/CA)

## (undated) DEVICE — DRAPE SRG LG BCK TBL DISP - 10/CA

## (undated) DEVICE — TIP INTPLS HFLO ML ORFC BTRY - (12/CS)  FOR SURGILAV

## (undated) DEVICE — SUTURE GENERAL

## (undated) DEVICE — SET EXTENSION WITH 2 PORTS (48EA/CA) ***PART #2C8610 IS A SUBSTITUTE*****

## (undated) DEVICE — GLOVE BIOGEL SZ 8 SURGICAL PF LTX - (50PR/BX 4BX/CA)

## (undated) DEVICE — SENSOR SPO2 NEO LNCS ADHESIVE (20/BX) SEE USER NOTES

## (undated) DEVICE — SUTURE 3-0 MONOCRYL PLUS PS-1 - 27 INCH (36/BX)

## (undated) DEVICE — SET LEADWIRE 5 LEAD BEDSIDE DISPOSABLE ECG (1SET OF 5/EA)

## (undated) DEVICE — GLOVE BIOGEL INDICATOR SZ 8 SURGICAL PF LTX - (50/BX 4BX/CA)

## (undated) DEVICE — CANISTER SUCTION 3000ML MECHANICAL FILTER AUTO SHUTOFF MEDI-VAC NONSTERILE LF DISP  (40EA/CA)

## (undated) DEVICE — SUTURE 2-0 MONOCRYL PLUS UNDYED CT-1 1 X 36 (36EA/BX)"

## (undated) DEVICE — TOWELS CLOTH SURGICAL - (4/PK 20PK/CA)

## (undated) DEVICE — DRAPE C-ARM LARGE 41IN X 74 IN - (10/BX 2BX/CA)

## (undated) DEVICE — LENS/HOOD FOR SPACESUIT - (32/PK) PEEL AWAY FACE

## (undated) DEVICE — PEN SKIN MARKER W/RULER - (50EA/BX)

## (undated) DEVICE — KIT ANESTHESIA W/CIRCUIT & 3/LT BAG W/FILTER (20EA/CA)

## (undated) DEVICE — TUBING CLEARLINK DUO-VENT - C-FLO (48EA/CA)

## (undated) DEVICE — KIT ROOM DECONTAMINATION

## (undated) DEVICE — SODIUM CHL. IRRIGATION 0.9% 3000ML (4EA/CA 65CA/PF)

## (undated) DEVICE — BLADE 90X18X1.27MM SAW SAGITTAL

## (undated) DEVICE — ELECTRODE DUAL RETURN W/ CORD - (50/PK)

## (undated) DEVICE — PROTECTOR ULNA NERVE - (36PR/CA)

## (undated) DEVICE — MASK ANESTHESIA ADULT  - (100/CA)

## (undated) DEVICE — NEPTUNE 4 PORT MANIFOLD - (20/PK)

## (undated) DEVICE — PACK TOTAL HIP - (1/CA)

## (undated) DEVICE — TUBE E-T HI-LO CUFF 7.0MM (10EA/PK)

## (undated) DEVICE — HEAD HOLDER JUNIOR/ADULT

## (undated) DEVICE — LACTATED RINGERS INJ 1000 ML - (14EA/CA 60CA/PF)

## (undated) DEVICE — SUTURE 0 SILK TIES (36PK/BX)

## (undated) DEVICE — HANDPIECE 10FT INTPLS SCT PLS IRRIGATION HAND CONTROL SET (6/PK)

## (undated) DEVICE — DRESSING TRANSPARENT FILM TEGADERM 4 X 4.75" (50EA/BX)"

## (undated) DEVICE — SUTURE 1 VICRYL PLUS CTX - 8 X 18 INCH (12/BX)

## (undated) DEVICE — SUCTION INSTRUMENT YANKAUER BULBOUS TIP W/O VENT (50EA/CA)

## (undated) DEVICE — SODIUM CHL IRRIGATION 0.9% 1000ML (12EA/CA)

## (undated) DEVICE — GLOVE BIOGEL INDICATOR SZ 7SURGICAL PF LTX - (50/BX 4BX/CA)

## (undated) DEVICE — DERMABOND ADVANCED - (12EA/BX)

## (undated) DEVICE — SPONGE GAUZE STER 4X4 8-PL - (2/PK 50PK/BX 12BX/CS)

## (undated) DEVICE — STAPLER SKIN DISP - (6/BX 10BX/CA) VISISTAT

## (undated) DEVICE — ELECTRODE 850 FOAM ADHESIVE - HYDROGEL RADIOTRNSPRNT (50/PK)

## (undated) DEVICE — DRAPE SURGICAL U 77X120 - (10/CA)

## (undated) DEVICE — GOWN WARMING STANDARD FLEX - (30/CA)